# Patient Record
Sex: FEMALE | Race: WHITE | NOT HISPANIC OR LATINO | URBAN - METROPOLITAN AREA
[De-identification: names, ages, dates, MRNs, and addresses within clinical notes are randomized per-mention and may not be internally consistent; named-entity substitution may affect disease eponyms.]

---

## 2017-01-03 ENCOUNTER — FOLLOW UP ESTABLISHED (OUTPATIENT)
Dept: URBAN - METROPOLITAN AREA CLINIC 24 | Facility: CLINIC | Age: 77
End: 2017-01-03
Payer: MEDICARE

## 2017-01-03 PROCEDURE — 92134 CPTRZ OPH DX IMG PST SGM RTA: CPT | Performed by: OPHTHALMOLOGY

## 2017-01-03 PROCEDURE — 67028 INJECTION EYE DRUG: CPT | Performed by: OPHTHALMOLOGY

## 2017-01-03 ASSESSMENT — INTRAOCULAR PRESSURE
OD: 15
OS: 13

## 2017-01-30 ENCOUNTER — FOLLOW UP ESTABLISHED (OUTPATIENT)
Dept: URBAN - METROPOLITAN AREA CLINIC 30 | Facility: CLINIC | Age: 77
End: 2017-01-30
Payer: COMMERCIAL

## 2017-01-30 PROCEDURE — 67028 INJECTION EYE DRUG: CPT | Performed by: OPHTHALMOLOGY

## 2017-01-30 ASSESSMENT — INTRAOCULAR PRESSURE
OS: 13
OD: 15

## 2017-02-13 ENCOUNTER — FOLLOW UP ESTABLISHED (OUTPATIENT)
Dept: URBAN - METROPOLITAN AREA CLINIC 30 | Facility: CLINIC | Age: 77
End: 2017-02-13
Payer: COMMERCIAL

## 2017-02-13 PROCEDURE — 92134 CPTRZ OPH DX IMG PST SGM RTA: CPT | Performed by: OPHTHALMOLOGY

## 2017-02-13 PROCEDURE — 67028 INJECTION EYE DRUG: CPT | Performed by: OPHTHALMOLOGY

## 2017-02-13 ASSESSMENT — INTRAOCULAR PRESSURE
OD: 18
OS: 18

## 2017-03-13 ENCOUNTER — FOLLOW UP ESTABLISHED (OUTPATIENT)
Dept: URBAN - METROPOLITAN AREA CLINIC 30 | Facility: CLINIC | Age: 77
End: 2017-03-13
Payer: COMMERCIAL

## 2017-03-13 PROCEDURE — 92134 CPTRZ OPH DX IMG PST SGM RTA: CPT | Performed by: OPHTHALMOLOGY

## 2017-03-13 PROCEDURE — 92242 FLUORESCEIN&ICG ANGIOGRAPHY: CPT | Performed by: OPHTHALMOLOGY

## 2017-03-13 PROCEDURE — 67028 INJECTION EYE DRUG: CPT | Performed by: OPHTHALMOLOGY

## 2017-03-13 ASSESSMENT — INTRAOCULAR PRESSURE
OD: 15
OS: 12

## 2017-04-24 ENCOUNTER — FOLLOW UP ESTABLISHED (OUTPATIENT)
Dept: URBAN - METROPOLITAN AREA CLINIC 30 | Facility: CLINIC | Age: 77
End: 2017-04-24
Payer: COMMERCIAL

## 2017-04-24 PROCEDURE — 92134 CPTRZ OPH DX IMG PST SGM RTA: CPT | Performed by: OPHTHALMOLOGY

## 2017-04-24 PROCEDURE — 67028 INJECTION EYE DRUG: CPT | Performed by: OPHTHALMOLOGY

## 2017-04-24 ASSESSMENT — INTRAOCULAR PRESSURE
OD: 22
OS: 15

## 2017-06-05 ENCOUNTER — FOLLOW UP ESTABLISHED (OUTPATIENT)
Dept: URBAN - METROPOLITAN AREA CLINIC 30 | Facility: CLINIC | Age: 77
End: 2017-06-05
Payer: COMMERCIAL

## 2017-06-05 PROCEDURE — 92134 CPTRZ OPH DX IMG PST SGM RTA: CPT | Performed by: OPHTHALMOLOGY

## 2017-06-05 PROCEDURE — 67028 INJECTION EYE DRUG: CPT | Performed by: OPHTHALMOLOGY

## 2017-06-05 ASSESSMENT — INTRAOCULAR PRESSURE
OD: 24
OS: 17

## 2017-07-20 ENCOUNTER — FOLLOW UP ESTABLISHED (OUTPATIENT)
Dept: URBAN - METROPOLITAN AREA CLINIC 24 | Facility: CLINIC | Age: 77
End: 2017-07-20
Payer: COMMERCIAL

## 2017-07-20 DIAGNOSIS — H35.3221 EXDTVE AGE-REL MCLR DEGN, LEFT EYE, WITH ACTV CHRDL NEOVAS: Primary | ICD-10-CM

## 2017-07-20 PROCEDURE — 67028 INJECTION EYE DRUG: CPT | Performed by: OPHTHALMOLOGY

## 2017-07-20 PROCEDURE — 92235 FLUORESCEIN ANGRPH MLTIFRAME: CPT | Performed by: OPHTHALMOLOGY

## 2017-07-20 PROCEDURE — 92134 CPTRZ OPH DX IMG PST SGM RTA: CPT | Performed by: OPHTHALMOLOGY

## 2017-07-20 ASSESSMENT — INTRAOCULAR PRESSURE
OD: 18
OS: 13

## 2017-09-11 ENCOUNTER — FOLLOW UP ESTABLISHED (OUTPATIENT)
Dept: URBAN - METROPOLITAN AREA CLINIC 30 | Facility: CLINIC | Age: 77
End: 2017-09-11
Payer: COMMERCIAL

## 2017-09-11 PROCEDURE — 67028 INJECTION EYE DRUG: CPT | Performed by: OPHTHALMOLOGY

## 2017-09-11 PROCEDURE — 92134 CPTRZ OPH DX IMG PST SGM RTA: CPT | Performed by: OPHTHALMOLOGY

## 2017-09-11 ASSESSMENT — INTRAOCULAR PRESSURE
OD: 28
OS: 17

## 2017-11-06 ENCOUNTER — FOLLOW UP ESTABLISHED (OUTPATIENT)
Dept: URBAN - METROPOLITAN AREA CLINIC 30 | Facility: CLINIC | Age: 77
End: 2017-11-06
Payer: COMMERCIAL

## 2017-11-06 PROCEDURE — 92134 CPTRZ OPH DX IMG PST SGM RTA: CPT | Performed by: OPHTHALMOLOGY

## 2017-11-06 PROCEDURE — 67028 INJECTION EYE DRUG: CPT | Performed by: OPHTHALMOLOGY

## 2017-11-06 PROCEDURE — 92250 FUNDUS PHOTOGRAPHY W/I&R: CPT | Performed by: OPHTHALMOLOGY

## 2017-11-06 ASSESSMENT — INTRAOCULAR PRESSURE
OD: 30
OS: 20

## 2017-11-14 ENCOUNTER — FOLLOW UP ESTABLISHED (OUTPATIENT)
Dept: URBAN - METROPOLITAN AREA CLINIC 30 | Facility: CLINIC | Age: 77
End: 2017-11-14
Payer: COMMERCIAL

## 2017-11-14 DIAGNOSIS — H40.051 OCULAR HYPERTENSION, RIGHT EYE: Primary | ICD-10-CM

## 2017-11-14 PROCEDURE — 92014 COMPRE OPH EXAM EST PT 1/>: CPT | Performed by: OPTOMETRIST

## 2017-11-14 PROCEDURE — 76514 ECHO EXAM OF EYE THICKNESS: CPT | Performed by: OPTOMETRIST

## 2017-11-14 PROCEDURE — 92134 CPTRZ OPH DX IMG PST SGM RTA: CPT | Performed by: OPTOMETRIST

## 2017-11-14 RX ORDER — DORZOLAMIDE HYDROCHLORIDE AND TIMOLOL MALEATE 20; 5 MG/ML; MG/ML
SOLUTION/ DROPS OPHTHALMIC
Qty: 1 | Refills: 5 | Status: INACTIVE
Start: 2017-11-14 | End: 2018-02-19

## 2017-11-14 ASSESSMENT — VISUAL ACUITY
OS: 20/25
OD: 20/60

## 2017-11-14 ASSESSMENT — INTRAOCULAR PRESSURE
OD: 33
OS: 24
OS: 20
OD: 38

## 2017-11-14 ASSESSMENT — KERATOMETRY
OD: 43.10
OS: 43.25

## 2017-11-21 ENCOUNTER — FOLLOW UP ESTABLISHED (OUTPATIENT)
Dept: URBAN - METROPOLITAN AREA CLINIC 30 | Facility: CLINIC | Age: 77
End: 2017-11-21
Payer: COMMERCIAL

## 2017-11-21 PROCEDURE — 99213 OFFICE O/P EST LOW 20 MIN: CPT | Performed by: OPTOMETRIST

## 2017-11-21 ASSESSMENT — INTRAOCULAR PRESSURE
OD: 18
OS: 18

## 2018-01-03 ENCOUNTER — FOLLOW UP ESTABLISHED (OUTPATIENT)
Dept: URBAN - METROPOLITAN AREA CLINIC 24 | Facility: CLINIC | Age: 78
End: 2018-01-03
Payer: MEDICARE

## 2018-01-03 PROCEDURE — 92242 FLUORESCEIN&ICG ANGIOGRAPHY: CPT | Performed by: OPHTHALMOLOGY

## 2018-01-03 PROCEDURE — 92134 CPTRZ OPH DX IMG PST SGM RTA: CPT | Performed by: OPHTHALMOLOGY

## 2018-01-03 ASSESSMENT — INTRAOCULAR PRESSURE
OS: 24
OD: 22

## 2018-02-26 ENCOUNTER — FOLLOW UP ESTABLISHED (OUTPATIENT)
Dept: URBAN - METROPOLITAN AREA CLINIC 30 | Facility: CLINIC | Age: 78
End: 2018-02-26
Payer: MEDICARE

## 2018-02-26 PROCEDURE — 92134 CPTRZ OPH DX IMG PST SGM RTA: CPT | Performed by: OPHTHALMOLOGY

## 2018-02-26 ASSESSMENT — INTRAOCULAR PRESSURE
OS: 32
OD: 32

## 2018-04-23 ENCOUNTER — FOLLOW UP ESTABLISHED (OUTPATIENT)
Dept: URBAN - METROPOLITAN AREA CLINIC 30 | Facility: CLINIC | Age: 78
End: 2018-04-23
Payer: MEDICARE

## 2018-04-23 PROCEDURE — 92250 FUNDUS PHOTOGRAPHY W/I&R: CPT | Performed by: OPHTHALMOLOGY

## 2018-04-23 PROCEDURE — 92014 COMPRE OPH EXAM EST PT 1/>: CPT | Performed by: OPHTHALMOLOGY

## 2018-04-23 RX ORDER — LEVOTHYROXINE SODIUM 75 UG/1
TABLET ORAL
Qty: 0 | Refills: 0 | Status: INACTIVE
Start: 2018-04-23 | End: 2018-06-18

## 2018-04-23 ASSESSMENT — INTRAOCULAR PRESSURE
OD: 22
OS: 22

## 2018-05-04 ENCOUNTER — FOLLOW UP ESTABLISHED (OUTPATIENT)
Dept: URBAN - METROPOLITAN AREA CLINIC 30 | Facility: CLINIC | Age: 78
End: 2018-05-04
Payer: MEDICARE

## 2018-05-04 DIAGNOSIS — H40.053 OCULAR HYPERTENSION, BILATERAL: Primary | ICD-10-CM

## 2018-05-04 PROCEDURE — 92134 CPTRZ OPH DX IMG PST SGM RTA: CPT | Performed by: OPTOMETRIST

## 2018-05-04 PROCEDURE — 92014 COMPRE OPH EXAM EST PT 1/>: CPT | Performed by: OPTOMETRIST

## 2018-05-04 ASSESSMENT — INTRAOCULAR PRESSURE
OD: 39
OD: 38
OS: 38
OS: 40

## 2018-05-04 ASSESSMENT — VISUAL ACUITY
OD: 20/60
OS: 20/50

## 2018-05-04 ASSESSMENT — KERATOMETRY
OD: 43.08
OS: 43.45

## 2018-05-10 ENCOUNTER — FOLLOW UP ESTABLISHED (OUTPATIENT)
Dept: URBAN - METROPOLITAN AREA CLINIC 30 | Facility: CLINIC | Age: 78
End: 2018-05-10
Payer: MEDICARE

## 2018-05-10 PROCEDURE — 92012 INTRM OPH EXAM EST PATIENT: CPT | Performed by: OPTOMETRIST

## 2018-05-10 RX ORDER — BRIMONIDINE TARTRATE 2 MG/ML
0.2 % SOLUTION/ DROPS OPHTHALMIC
Qty: 5 | Refills: 3 | Status: INACTIVE
Start: 2018-05-10 | End: 2018-08-29

## 2018-05-10 RX ORDER — ACETAZOLAMIDE 250 MG/1
250 MG TABLET ORAL
Qty: 20 | Refills: 3 | Status: INACTIVE
Start: 2018-05-10 | End: 2018-07-05

## 2018-05-10 ASSESSMENT — INTRAOCULAR PRESSURE
OD: 39
OS: 21
OD: 40
OS: 22

## 2018-05-15 ENCOUNTER — FOLLOW UP ESTABLISHED (OUTPATIENT)
Dept: URBAN - METROPOLITAN AREA CLINIC 24 | Facility: CLINIC | Age: 78
End: 2018-05-15
Payer: MEDICARE

## 2018-05-15 DIAGNOSIS — H40.1131 PRIMARY OPEN-ANGLE GLAUCOMA, MILD STAGE, BILATERAL: Primary | ICD-10-CM

## 2018-05-15 PROCEDURE — 92083 EXTENDED VISUAL FIELD XM: CPT | Performed by: OPHTHALMOLOGY

## 2018-05-15 PROCEDURE — 92020 GONIOSCOPY: CPT | Performed by: OPHTHALMOLOGY

## 2018-05-15 PROCEDURE — 92133 CPTRZD OPH DX IMG PST SGM ON: CPT | Performed by: OPHTHALMOLOGY

## 2018-05-15 PROCEDURE — 92014 COMPRE OPH EXAM EST PT 1/>: CPT | Performed by: OPHTHALMOLOGY

## 2018-05-15 PROCEDURE — 92250 FUNDUS PHOTOGRAPHY W/I&R: CPT | Performed by: OPHTHALMOLOGY

## 2018-05-15 ASSESSMENT — INTRAOCULAR PRESSURE
OD: 18
OS: 18

## 2018-06-18 ENCOUNTER — FOLLOW UP ESTABLISHED (OUTPATIENT)
Dept: URBAN - METROPOLITAN AREA CLINIC 30 | Facility: CLINIC | Age: 78
End: 2018-06-18
Payer: MEDICARE

## 2018-06-18 PROCEDURE — 92134 CPTRZ OPH DX IMG PST SGM RTA: CPT | Performed by: OPHTHALMOLOGY

## 2018-06-18 PROCEDURE — 92235 FLUORESCEIN ANGRPH MLTIFRAME: CPT | Performed by: OPHTHALMOLOGY

## 2018-06-18 RX ORDER — BIMATOPROST 0.1 MG/ML
0.01 % SOLUTION/ DROPS OPHTHALMIC
Qty: 0 | Refills: 0 | Status: INACTIVE
Start: 2018-06-18 | End: 2018-07-05

## 2018-06-18 ASSESSMENT — INTRAOCULAR PRESSURE
OS: 20
OD: 21

## 2018-06-28 ENCOUNTER — OFFICE VISIT (OUTPATIENT)
Dept: URBAN - METROPOLITAN AREA CLINIC 23 | Facility: CLINIC | Age: 78
End: 2018-06-28
Payer: COMMERCIAL

## 2018-06-28 DIAGNOSIS — H40.1134 PRIMARY OPEN-ANGLE GLAUCOMA, BILATERAL, INDETERMINATE STAGE: Primary | ICD-10-CM

## 2018-06-28 PROCEDURE — 92004 COMPRE OPH EXAM NEW PT 1/>: CPT | Performed by: OPHTHALMOLOGY

## 2018-06-28 PROCEDURE — 92020 GONIOSCOPY: CPT | Performed by: OPHTHALMOLOGY

## 2018-06-28 PROCEDURE — 76514 ECHO EXAM OF EYE THICKNESS: CPT | Performed by: OPHTHALMOLOGY

## 2018-06-28 PROCEDURE — 92133 CPTRZD OPH DX IMG PST SGM ON: CPT | Performed by: OPHTHALMOLOGY

## 2018-06-28 RX ORDER — NETARSUDIL 0.2 MG/ML
0.02 % SOLUTION/ DROPS OPHTHALMIC; TOPICAL
Qty: 5 | Refills: 5 | Status: INACTIVE
Start: 2018-06-28 | End: 2018-07-23

## 2018-06-28 RX ORDER — DORZOLAMIDE HYDROCHLORIDE AND TIMOLOL MALEATE 20; 5 MG/ML; MG/ML
SOLUTION/ DROPS OPHTHALMIC
Qty: 1 | Refills: 5 | Status: INACTIVE
Start: 2018-06-28 | End: 2018-08-29

## 2018-06-28 ASSESSMENT — INTRAOCULAR PRESSURE
OD: 25
OS: 33

## 2018-06-28 NOTE — IMPRESSION/PLAN
Impression: Primary open-angle glaucoma, bilateral, indeterminate stage: H40.1134. OU.
-IOP elevated ou - ONH large CD ratio ou -
hx of avastin injections ou. Plan: Discussed diagnosis, explained and understood by patient. Discussed IOP/ONH/Glaucoma management and risks. continue brimonidine tid ou,
acetazolamide tabs bid PO. continue cosopt bid ou and lumigan OD qhs. OCT ordered, performed and reviewed. Start Rhopressa ou qhs. samples given today-- Discussed side effects of glaucoma meds. Recommends Trabeculectomy ou to lower IOP. RL=2 Discussed RBA's including bleeding, infection, loss of eye or sight. Patient elects Trabeculectomy OD but wants to wait for now. Will continue to monitor condition and symptoms.

## 2018-07-23 ENCOUNTER — OFFICE VISIT (OUTPATIENT)
Dept: URBAN - METROPOLITAN AREA CLINIC 23 | Facility: CLINIC | Age: 78
End: 2018-07-23
Payer: COMMERCIAL

## 2018-07-23 PROCEDURE — 99213 OFFICE O/P EST LOW 20 MIN: CPT | Performed by: OPHTHALMOLOGY

## 2018-07-23 RX ORDER — ACETAZOLAMIDE 250 MG/1
250 MG TABLET ORAL
Qty: 100 | Refills: 2 | Status: INACTIVE
Start: 2018-07-23 | End: 2018-08-29

## 2018-07-23 RX ORDER — NETARSUDIL 0.2 MG/ML
0.02 % SOLUTION/ DROPS OPHTHALMIC; TOPICAL
Qty: 5 | Refills: 5 | Status: INACTIVE
Start: 2018-07-23 | End: 2018-08-27

## 2018-07-23 RX ORDER — BIMATOPROST 0.1 MG/ML
0.01 % SOLUTION/ DROPS OPHTHALMIC
Qty: 1 | Refills: 5 | Status: INACTIVE
Start: 2018-07-23 | End: 2018-08-29

## 2018-07-23 ASSESSMENT — INTRAOCULAR PRESSURE
OS: 27
OD: 20

## 2018-07-23 NOTE — IMPRESSION/PLAN
Impression: Primary open-angle glaucoma, bilateral, moderate stage: R86.5365. OU.
-IOP lowered with rhopressa but still too high. ONH large CD ratio ou - hx of avastin injections ou. Plan: Discussed diagnosis in great detail. Discussed IOP/ONH/Glaucoma management and risks. continue brimonidine tid ou, start lumigan OS qhs also. acetazolamide tabs bid PO. continue cosopt bid ou and lumigan OD qhs.
continue Rhopressa ou qhs. samples given today--
discussed ALT  and CPC laser and Trabeculectomy ou or aqueous shunt. patient will decide if she wants ALT or micropulse CPC laser OD or Trabeculectomy. discussed RBA's including bleeding, infection, loss of eye or sight. RL=2 Patient will decide if she wants Trabeculectomy OD.

## 2018-08-27 ENCOUNTER — FOLLOW UP ESTABLISHED (OUTPATIENT)
Dept: URBAN - METROPOLITAN AREA CLINIC 30 | Facility: CLINIC | Age: 78
End: 2018-08-27
Payer: MEDICARE

## 2018-08-27 PROCEDURE — 92134 CPTRZ OPH DX IMG PST SGM RTA: CPT | Performed by: OPHTHALMOLOGY

## 2018-08-27 ASSESSMENT — INTRAOCULAR PRESSURE
OS: 13
OD: 28

## 2018-09-12 ENCOUNTER — SURGERY (OUTPATIENT)
Dept: URBAN - METROPOLITAN AREA SURGERY 11 | Facility: SURGERY | Age: 78
End: 2018-09-12
Payer: COMMERCIAL

## 2018-09-12 PROCEDURE — 66170 GLAUCOMA SURGERY: CPT | Performed by: OPHTHALMOLOGY

## 2018-09-13 ENCOUNTER — POST-OPERATIVE VISIT (OUTPATIENT)
Dept: URBAN - METROPOLITAN AREA CLINIC 23 | Facility: CLINIC | Age: 78
End: 2018-09-13

## 2018-09-13 DIAGNOSIS — Z09 ENCNTR FOR F/U EXAM AFT TRTMT FOR COND OTH THAN MALIG NEOPLM: Primary | ICD-10-CM

## 2018-09-13 PROCEDURE — 99024 POSTOP FOLLOW-UP VISIT: CPT | Performed by: OPTOMETRIST

## 2018-09-13 ASSESSMENT — INTRAOCULAR PRESSURE
OD: 18
OS: 43

## 2018-09-17 ENCOUNTER — POST-OPERATIVE VISIT (OUTPATIENT)
Dept: URBAN - METROPOLITAN AREA CLINIC 23 | Facility: CLINIC | Age: 78
End: 2018-09-17

## 2018-09-17 PROCEDURE — 99024 POSTOP FOLLOW-UP VISIT: CPT | Performed by: OPHTHALMOLOGY

## 2018-09-17 RX ORDER — DORZOLAMIDE HYDROCHLORIDE AND TIMOLOL MALEATE 20; 5 MG/ML; MG/ML
SOLUTION/ DROPS OPHTHALMIC
Qty: 1 | Refills: 5 | Status: INACTIVE
Start: 2018-09-17 | End: 2018-12-04

## 2018-09-17 RX ORDER — BIMATOPROST 0.1 MG/ML
0.01 % SOLUTION/ DROPS OPHTHALMIC
Qty: 1 | Refills: 5 | Status: INACTIVE
Start: 2018-09-17 | End: 2018-12-04

## 2018-09-17 RX ORDER — BRIMONIDINE TARTRATE 2 MG/ML
0.2 % SOLUTION/ DROPS OPHTHALMIC
Qty: 1 | Refills: 3 | Status: INACTIVE
Start: 2018-09-17 | End: 2018-12-04

## 2018-09-17 ASSESSMENT — INTRAOCULAR PRESSURE
OD: 14
OS: 24

## 2018-10-03 ENCOUNTER — OFFICE VISIT (OUTPATIENT)
Dept: URBAN - METROPOLITAN AREA CLINIC 23 | Facility: CLINIC | Age: 78
End: 2018-10-03

## 2018-10-03 PROCEDURE — 99024 POSTOP FOLLOW-UP VISIT: CPT | Performed by: OPHTHALMOLOGY

## 2018-10-03 ASSESSMENT — INTRAOCULAR PRESSURE
OS: 33
OD: 15

## 2018-10-18 ENCOUNTER — POST-OPERATIVE VISIT (OUTPATIENT)
Dept: URBAN - METROPOLITAN AREA CLINIC 23 | Facility: CLINIC | Age: 78
End: 2018-10-18

## 2018-10-18 PROCEDURE — 99024 POSTOP FOLLOW-UP VISIT: CPT | Performed by: OPHTHALMOLOGY

## 2018-10-18 ASSESSMENT — INTRAOCULAR PRESSURE
OD: 16
OS: 34

## 2018-11-05 ENCOUNTER — FOLLOW UP ESTABLISHED (OUTPATIENT)
Dept: URBAN - METROPOLITAN AREA CLINIC 30 | Facility: CLINIC | Age: 78
End: 2018-11-05
Payer: MEDICARE

## 2018-11-05 PROCEDURE — 92134 CPTRZ OPH DX IMG PST SGM RTA: CPT | Performed by: OPHTHALMOLOGY

## 2018-11-05 PROCEDURE — 92014 COMPRE OPH EXAM EST PT 1/>: CPT | Performed by: OPHTHALMOLOGY

## 2018-11-05 ASSESSMENT — INTRAOCULAR PRESSURE
OD: 14
OS: 18

## 2018-11-06 ENCOUNTER — POST-OPERATIVE VISIT (OUTPATIENT)
Dept: URBAN - METROPOLITAN AREA CLINIC 23 | Facility: CLINIC | Age: 78
End: 2018-11-06

## 2018-11-06 PROCEDURE — 99024 POSTOP FOLLOW-UP VISIT: CPT | Performed by: OPHTHALMOLOGY

## 2018-11-06 ASSESSMENT — INTRAOCULAR PRESSURE
OD: 17
OS: 24

## 2018-11-28 ENCOUNTER — SURGERY (OUTPATIENT)
Dept: URBAN - METROPOLITAN AREA SURGERY 11 | Facility: SURGERY | Age: 78
End: 2018-11-28
Payer: MEDICARE

## 2018-11-28 PROCEDURE — 66170 GLAUCOMA SURGERY: CPT | Performed by: OPHTHALMOLOGY

## 2018-11-29 ENCOUNTER — POST-OPERATIVE VISIT (OUTPATIENT)
Dept: URBAN - METROPOLITAN AREA CLINIC 23 | Facility: CLINIC | Age: 78
End: 2018-11-29

## 2018-11-29 PROCEDURE — 99024 POSTOP FOLLOW-UP VISIT: CPT | Performed by: OPTOMETRIST

## 2018-11-29 ASSESSMENT — INTRAOCULAR PRESSURE
OS: 30
OS: 20
OD: 16
OD: 24

## 2018-12-04 ENCOUNTER — POST-OPERATIVE VISIT (OUTPATIENT)
Dept: URBAN - METROPOLITAN AREA CLINIC 23 | Facility: CLINIC | Age: 78
End: 2018-12-04
Payer: MEDICARE

## 2018-12-04 PROCEDURE — 99024 POSTOP FOLLOW-UP VISIT: CPT | Performed by: OPHTHALMOLOGY

## 2018-12-04 RX ORDER — PREDNISOLONE ACETATE 10 MG/ML
1 % SUSPENSION/ DROPS OPHTHALMIC
Qty: 10 | Refills: 4 | Status: INACTIVE
Start: 2018-12-04 | End: 2019-02-27

## 2018-12-04 ASSESSMENT — INTRAOCULAR PRESSURE
OD: 20
OS: 27

## 2018-12-13 ENCOUNTER — POST-OPERATIVE VISIT (OUTPATIENT)
Dept: URBAN - METROPOLITAN AREA CLINIC 23 | Facility: CLINIC | Age: 78
End: 2018-12-13

## 2018-12-13 PROCEDURE — 99024 POSTOP FOLLOW-UP VISIT: CPT | Performed by: OPHTHALMOLOGY

## 2018-12-13 ASSESSMENT — INTRAOCULAR PRESSURE
OS: 22
OS: 8
OD: 18

## 2018-12-27 ENCOUNTER — POST-OPERATIVE VISIT (OUTPATIENT)
Dept: URBAN - METROPOLITAN AREA CLINIC 32 | Facility: CLINIC | Age: 78
End: 2018-12-27

## 2018-12-27 PROCEDURE — 99024 POSTOP FOLLOW-UP VISIT: CPT | Performed by: OPHTHALMOLOGY

## 2018-12-27 ASSESSMENT — INTRAOCULAR PRESSURE
OS: 24
OS: 16
OD: 16
OD: 21

## 2019-01-28 ENCOUNTER — FOLLOW UP ESTABLISHED (OUTPATIENT)
Dept: URBAN - METROPOLITAN AREA CLINIC 30 | Facility: CLINIC | Age: 79
End: 2019-01-28
Payer: MEDICARE

## 2019-01-28 PROCEDURE — 92134 CPTRZ OPH DX IMG PST SGM RTA: CPT | Performed by: OPHTHALMOLOGY

## 2019-01-28 ASSESSMENT — INTRAOCULAR PRESSURE
OD: 14
OS: 13

## 2019-01-30 ENCOUNTER — POST-OPERATIVE VISIT (OUTPATIENT)
Dept: URBAN - METROPOLITAN AREA CLINIC 23 | Facility: CLINIC | Age: 79
End: 2019-01-30

## 2019-01-30 PROCEDURE — 99024 POSTOP FOLLOW-UP VISIT: CPT | Performed by: OPHTHALMOLOGY

## 2019-01-30 ASSESSMENT — INTRAOCULAR PRESSURE
OD: 22
OS: 20

## 2019-02-27 ENCOUNTER — OFFICE VISIT (OUTPATIENT)
Dept: URBAN - METROPOLITAN AREA CLINIC 23 | Facility: CLINIC | Age: 79
End: 2019-02-27
Payer: MEDICARE

## 2019-02-27 PROCEDURE — 99213 OFFICE O/P EST LOW 20 MIN: CPT | Performed by: OPHTHALMOLOGY

## 2019-02-27 ASSESSMENT — INTRAOCULAR PRESSURE
OD: 18
OS: 14

## 2019-02-27 NOTE — IMPRESSION/PLAN
Impression: Primary open-angle glaucoma, bilateral, moderate stage: B13.3669. OU. - Trabeculectomy OD 9/12/18-
-S/P Trab OS - 11/28/18
-IOP doing well ou - ONH stable ou Plan: Discussed diagnosis, explained and understood by patient. Discussed IOP/ONH/Glaucoma management and risks. Continue massage qid od and qd os. Taper prednisolone bid OS -
no glaucoma meds needed ou. Will continue to monitor condition and symptoms.

## 2019-04-01 ENCOUNTER — OFFICE VISIT (OUTPATIENT)
Dept: URBAN - METROPOLITAN AREA CLINIC 23 | Facility: CLINIC | Age: 79
End: 2019-04-01
Payer: MEDICARE

## 2019-04-01 PROCEDURE — 99213 OFFICE O/P EST LOW 20 MIN: CPT | Performed by: OPHTHALMOLOGY

## 2019-04-01 ASSESSMENT — INTRAOCULAR PRESSURE
OS: 15
OD: 18

## 2019-04-01 NOTE — IMPRESSION/PLAN
Impression: Primary open-angle glaucoma, bilateral, moderate stage: B16.7204. OU. - Trabeculectomy OD 9/12/18 Trabeculectomy OS  11/28/18 IOP/ONH stable OU Plan: Discussed diagnosis, explained and understood by patient. Discussed IOP/ONH/Glaucoma management and risks. Continue massage qid od and qd os. Taper pf qd os. No glaucoma meds needed ou. Will continue to monitor condition and symptoms.

## 2019-04-22 ENCOUNTER — FOLLOW UP ESTABLISHED (OUTPATIENT)
Dept: URBAN - METROPOLITAN AREA CLINIC 30 | Facility: CLINIC | Age: 79
End: 2019-04-22
Payer: MEDICARE

## 2019-04-22 PROCEDURE — 92250 FUNDUS PHOTOGRAPHY W/I&R: CPT | Performed by: OPHTHALMOLOGY

## 2019-04-22 PROCEDURE — 92235 FLUORESCEIN ANGRPH MLTIFRAME: CPT | Performed by: OPHTHALMOLOGY

## 2019-04-22 ASSESSMENT — INTRAOCULAR PRESSURE
OD: 15
OS: 13

## 2019-05-02 ENCOUNTER — OFFICE VISIT (OUTPATIENT)
Dept: URBAN - METROPOLITAN AREA CLINIC 23 | Facility: CLINIC | Age: 79
End: 2019-05-02
Payer: MEDICARE

## 2019-05-02 PROCEDURE — 92133 CPTRZD OPH DX IMG PST SGM ON: CPT | Performed by: OPHTHALMOLOGY

## 2019-05-02 PROCEDURE — 99213 OFFICE O/P EST LOW 20 MIN: CPT | Performed by: OPHTHALMOLOGY

## 2019-05-02 ASSESSMENT — INTRAOCULAR PRESSURE
OS: 11
OD: 16
OS: 16
OD: 20

## 2019-05-02 NOTE — IMPRESSION/PLAN
Impression: WET AMD OS then OD -AREDS2, diet, non- smoker, Amsler  -- Stable w injx.   Maintain Plan: F/U with Dr Gonzalez Husbands as scheduled

## 2019-05-02 NOTE — IMPRESSION/PLAN
Impression: Primary open-angle glaucoma, bilateral, moderate stage: X06.2385. OU. - Trabeculectomy OD 9/12/18 Trabeculectomy OS  11/28/18 IOP/ONH stable OU Plan: Discussed diagnosis, explained and understood by patient. Discussed IOP/ONH/Glaucoma management and risks. Continue massage qid od and qd os. OCT OU ordered and performed today and results discussed with patient. No glaucoma meds needed ou. Will continue to monitor condition and symptoms.

## 2019-07-15 ENCOUNTER — FOLLOW UP ESTABLISHED (OUTPATIENT)
Dept: URBAN - METROPOLITAN AREA CLINIC 30 | Facility: CLINIC | Age: 79
End: 2019-07-15
Payer: MEDICARE

## 2019-07-15 PROCEDURE — 92134 CPTRZ OPH DX IMG PST SGM RTA: CPT | Performed by: OPHTHALMOLOGY

## 2019-08-29 ENCOUNTER — OFFICE VISIT (OUTPATIENT)
Dept: URBAN - METROPOLITAN AREA CLINIC 23 | Facility: CLINIC | Age: 79
End: 2019-08-29
Payer: MEDICARE

## 2019-08-29 DIAGNOSIS — H35.3231 EXUDATIVE AGE-RELATED MACULAR DEGENERATION, BILATERAL, WITH ACTIVE CHOROIDAL NEOVASCULARIZATION: ICD-10-CM

## 2019-08-29 PROCEDURE — 99213 OFFICE O/P EST LOW 20 MIN: CPT | Performed by: OPHTHALMOLOGY

## 2019-08-29 ASSESSMENT — INTRAOCULAR PRESSURE
OS: 13
OD: 16

## 2019-08-29 NOTE — IMPRESSION/PLAN
Impression: Primary open-angle glaucoma, bilateral, moderate stage: W84.5022. OU. - Trabeculectomy OD 9/12/18 -- Trabeculectomy OS  11/28/18 -IOP doing well ou - ONH stable ou - Plan: Discussed diagnosis, explained and understood by patient. Discussed IOP/ONH/Glaucoma management and risks. Continue massage qid OD and qd OS. No glaucoma meds needed ou. continue to monitor condition and symptoms. Reconsult PRN with Dr Courtney Schumacher.

## 2019-08-29 NOTE — IMPRESSION/PLAN
Impression: Exudative age-related macular degeneration, bilateral, with active choroidal neovascularization: H35.3231. OU. established, stable - Plan: Discussed diagnosis in detail with patient. No treatment is required at this time. Use of vitamins has shown to improve the effects of ARMD. Counseling about the benefits and/or risks of the Age-Related Eye Disease Study (AREDS) formulation for preventing progression of age-related macular degeneration (AMD) was provided to the patient and/or caregiver(s). Will continue to observe condition and or symptoms.

## 2019-08-29 NOTE — IMPRESSION/PLAN
Impression: Age-related nuclear cataract, bilateral: H25.13. OU. Vision: vision affected. Plan: Cataracts account for the patient's vision complaints. Discussed diagnosis in detail with patient. Discussed treatment options with patient. Recommend cataract consultation when patient desires if unable to correct with glasses RX.

## 2019-10-03 ENCOUNTER — NEW PATIENT (OUTPATIENT)
Dept: URBAN - METROPOLITAN AREA CLINIC 30 | Facility: CLINIC | Age: 79
End: 2019-10-03
Payer: MEDICARE

## 2019-10-03 DIAGNOSIS — H40.1132 PRIMARY OPEN-ANGLE GLAUCOMA, BILATERAL, MODERATE STAGE: ICD-10-CM

## 2019-10-03 DIAGNOSIS — H04.123 TEAR FILM INSUFFICIENCY OF BILATERAL LACRIMAL GLANDS: ICD-10-CM

## 2019-10-03 DIAGNOSIS — H43.813 VITREOUS DEGENERATION, BILATERAL: ICD-10-CM

## 2019-10-03 PROCEDURE — 92134 CPTRZ OPH DX IMG PST SGM RTA: CPT | Performed by: OPTOMETRIST

## 2019-10-03 PROCEDURE — 92014 COMPRE OPH EXAM EST PT 1/>: CPT | Performed by: OPTOMETRIST

## 2019-10-03 PROCEDURE — 92004 COMPRE OPH EXAM NEW PT 1/>: CPT | Performed by: OPTOMETRIST

## 2019-10-03 ASSESSMENT — KERATOMETRY
OS: 43.28
OD: 43.08

## 2019-10-03 ASSESSMENT — INTRAOCULAR PRESSURE
OD: 23
OS: 19
OD: 24
OS: 20

## 2019-10-03 ASSESSMENT — VISUAL ACUITY
OS: 20/60
OD: 20/150

## 2019-10-04 ENCOUNTER — OFFICE VISIT (OUTPATIENT)
Dept: URBAN - METROPOLITAN AREA CLINIC 29 | Facility: CLINIC | Age: 79
End: 2019-10-04
Payer: MEDICARE

## 2019-10-04 PROCEDURE — 99213 OFFICE O/P EST LOW 20 MIN: CPT | Performed by: OPHTHALMOLOGY

## 2019-10-04 RX ORDER — TIMOLOL MALEATE 5 MG/ML
0.5 % SOLUTION/ DROPS OPHTHALMIC
Qty: 10 | Refills: 5 | Status: INACTIVE
Start: 2019-10-04 | End: 2020-10-15

## 2019-10-04 ASSESSMENT — INTRAOCULAR PRESSURE
OS: 20
OD: 23

## 2019-10-04 NOTE — IMPRESSION/PLAN
Impression: Primary open-angle glaucoma, bilateral, moderate stage: X23.2633. OU. Trabeculectomy OD 9/12/18 - Trabeculectomy OS  11/28/18 - IOP elevated ou - Plan: Discussed diagnosis, explained and understood by patient. Discussed IOP/ONH/Glaucoma management and risks. Continue massage qid OD and qd OS. Discussed options with patient to lower IOP MIGS procedure vs adding glaucoma meds. Patient elects drops for now. start timolol ou qam. discussed side-effects of glaucoma meds. continue to monitor condition and symptoms. Reconsult PRN with Dr Marlowe Paget.

## 2019-10-07 ENCOUNTER — FOLLOW UP ESTABLISHED (OUTPATIENT)
Dept: URBAN - METROPOLITAN AREA CLINIC 30 | Facility: CLINIC | Age: 79
End: 2019-10-07
Payer: MEDICARE

## 2019-10-07 PROCEDURE — 92014 COMPRE OPH EXAM EST PT 1/>: CPT | Performed by: OPHTHALMOLOGY

## 2019-10-07 PROCEDURE — 92134 CPTRZ OPH DX IMG PST SGM RTA: CPT | Performed by: OPHTHALMOLOGY

## 2019-10-07 ASSESSMENT — INTRAOCULAR PRESSURE
OS: 13
OD: 11
OD: 13
OS: 12

## 2019-11-08 ENCOUNTER — CONSULT (OUTPATIENT)
Dept: URBAN - METROPOLITAN AREA CLINIC 30 | Facility: CLINIC | Age: 79
End: 2019-11-08
Payer: MEDICARE

## 2019-11-08 PROCEDURE — 92133 CPTRZD OPH DX IMG PST SGM ON: CPT | Performed by: OPHTHALMOLOGY

## 2019-11-08 PROCEDURE — 92014 COMPRE OPH EXAM EST PT 1/>: CPT | Performed by: OPHTHALMOLOGY

## 2019-11-08 PROCEDURE — 92250 FUNDUS PHOTOGRAPHY W/I&R: CPT | Performed by: OPHTHALMOLOGY

## 2019-11-08 PROCEDURE — 76514 ECHO EXAM OF EYE THICKNESS: CPT | Performed by: OPHTHALMOLOGY

## 2019-11-08 PROCEDURE — 92083 EXTENDED VISUAL FIELD XM: CPT | Performed by: OPHTHALMOLOGY

## 2019-11-08 ASSESSMENT — VISUAL ACUITY
OD: 20/200
OS: 20/60

## 2019-11-08 ASSESSMENT — INTRAOCULAR PRESSURE
OS: 16
OD: 19

## 2019-12-04 ENCOUNTER — Encounter (OUTPATIENT)
Dept: URBAN - METROPOLITAN AREA CLINIC 24 | Facility: CLINIC | Age: 79
End: 2019-12-04
Payer: MEDICARE

## 2019-12-04 PROCEDURE — 65855 TRABECULOPLASTY LASER SURG: CPT | Performed by: OPHTHALMOLOGY

## 2019-12-11 ENCOUNTER — Encounter (OUTPATIENT)
Dept: URBAN - METROPOLITAN AREA CLINIC 24 | Facility: CLINIC | Age: 79
End: 2019-12-11
Payer: MEDICARE

## 2019-12-11 PROCEDURE — 65855 TRABECULOPLASTY LASER SURG: CPT | Performed by: OPHTHALMOLOGY

## 2019-12-26 ENCOUNTER — FOLLOW UP ESTABLISHED (OUTPATIENT)
Dept: URBAN - METROPOLITAN AREA CLINIC 24 | Facility: CLINIC | Age: 79
End: 2019-12-26
Payer: MEDICARE

## 2019-12-26 PROCEDURE — 92242 FLUORESCEIN&ICG ANGIOGRAPHY: CPT | Performed by: OPHTHALMOLOGY

## 2019-12-26 PROCEDURE — 92250 FUNDUS PHOTOGRAPHY W/I&R: CPT | Performed by: OPHTHALMOLOGY

## 2019-12-26 ASSESSMENT — INTRAOCULAR PRESSURE
OS: 12
OD: 12

## 2020-02-05 ENCOUNTER — FOLLOW UP ESTABLISHED (OUTPATIENT)
Dept: URBAN - METROPOLITAN AREA CLINIC 30 | Facility: CLINIC | Age: 80
End: 2020-02-05
Payer: MEDICARE

## 2020-02-05 PROCEDURE — 92014 COMPRE OPH EXAM EST PT 1/>: CPT | Performed by: OPHTHALMOLOGY

## 2020-02-05 ASSESSMENT — INTRAOCULAR PRESSURE
OD: 21
OS: 15

## 2020-04-06 ENCOUNTER — FOLLOW UP ESTABLISHED (OUTPATIENT)
Dept: URBAN - METROPOLITAN AREA CLINIC 30 | Facility: CLINIC | Age: 80
End: 2020-04-06
Payer: MEDICARE

## 2020-04-06 PROCEDURE — 92242 FLUORESCEIN&ICG ANGIOGRAPHY: CPT | Performed by: OPHTHALMOLOGY

## 2020-04-06 PROCEDURE — 92134 CPTRZ OPH DX IMG PST SGM RTA: CPT | Performed by: OPHTHALMOLOGY

## 2020-04-06 ASSESSMENT — INTRAOCULAR PRESSURE
OD: 14
OS: 12

## 2020-04-22 ENCOUNTER — FOLLOW UP ESTABLISHED (OUTPATIENT)
Dept: URBAN - METROPOLITAN AREA CLINIC 30 | Facility: CLINIC | Age: 80
End: 2020-04-22
Payer: MEDICARE

## 2020-04-22 DIAGNOSIS — H25.813 COMBINED FORMS OF AGE-RELATED CATARACT, BILATERAL: Primary | ICD-10-CM

## 2020-04-22 PROCEDURE — 92014 COMPRE OPH EXAM EST PT 1/>: CPT | Performed by: OPTOMETRIST

## 2020-04-22 PROCEDURE — 92134 CPTRZ OPH DX IMG PST SGM RTA: CPT | Performed by: OPTOMETRIST

## 2020-04-22 ASSESSMENT — INTRAOCULAR PRESSURE
OS: 19
OD: 20
OS: 15
OD: 21

## 2020-04-22 ASSESSMENT — VISUAL ACUITY
OS: 20/80
OD: 20/100

## 2020-04-22 ASSESSMENT — KERATOMETRY
OD: 43.27
OS: 42.77

## 2020-05-05 ENCOUNTER — Encounter (OUTPATIENT)
Dept: URBAN - METROPOLITAN AREA CLINIC 30 | Facility: CLINIC | Age: 80
End: 2020-05-05
Payer: MEDICARE

## 2020-05-05 DIAGNOSIS — H25.11 AGE-RELATED NUCLEAR CATARACT, RIGHT EYE: Primary | ICD-10-CM

## 2020-05-05 DIAGNOSIS — Z01.818 ENCOUNTER FOR OTHER PREPROCEDURAL EXAMINATION: Primary | ICD-10-CM

## 2020-05-05 PROCEDURE — 99213 OFFICE O/P EST LOW 20 MIN: CPT | Performed by: PHYSICIAN ASSISTANT

## 2020-05-08 ENCOUNTER — FOLLOW UP ESTABLISHED (OUTPATIENT)
Dept: URBAN - METROPOLITAN AREA CLINIC 24 | Facility: CLINIC | Age: 80
End: 2020-05-08
Payer: MEDICARE

## 2020-05-08 PROCEDURE — 92014 COMPRE OPH EXAM EST PT 1/>: CPT | Performed by: OPHTHALMOLOGY

## 2020-05-08 RX ORDER — OFLOXACIN 3 MG/ML
0.3 % SOLUTION/ DROPS OPHTHALMIC
Qty: 1 | Refills: 1 | Status: INACTIVE
Start: 2020-05-08 | End: 2020-07-27

## 2020-05-08 RX ORDER — KETOROLAC TROMETHAMINE 4 MG/ML
0.4 % SOLUTION/ DROPS OPHTHALMIC
Qty: 1 | Refills: 1 | Status: INACTIVE
Start: 2020-05-08 | End: 2020-08-12

## 2020-05-08 RX ORDER — FLUOROMETHOLONE 1 MG/ML
0.1 % SOLUTION/ DROPS OPHTHALMIC
Qty: 1 | Refills: 1 | Status: INACTIVE
Start: 2020-05-08 | End: 2020-08-12

## 2020-05-08 ASSESSMENT — INTRAOCULAR PRESSURE
OS: 16
OD: 20

## 2020-05-08 ASSESSMENT — VISUAL ACUITY
OD: 20/80
OS: 20/80

## 2020-06-04 ENCOUNTER — SURGERY (OUTPATIENT)
Dept: URBAN - METROPOLITAN AREA SURGERY 12 | Facility: SURGERY | Age: 80
End: 2020-06-04
Payer: MEDICARE

## 2020-06-04 PROCEDURE — 66174 TRLUML DIL AQ O/F CAN W/O ST: CPT | Performed by: OPHTHALMOLOGY

## 2020-06-04 PROCEDURE — 0191T INSERTION OF ANTERIOR SEGMENT AQUEOUS DRAINAGE DEVICE, W/OUT EXTRAOCULAR RESERVO: CPT | Performed by: OPHTHALMOLOGY

## 2020-06-04 PROCEDURE — 66984 XCAPSL CTRC RMVL W/O ECP: CPT | Performed by: OPHTHALMOLOGY

## 2020-06-05 ENCOUNTER — POST OP (OUTPATIENT)
Dept: URBAN - METROPOLITAN AREA CLINIC 24 | Facility: CLINIC | Age: 80
End: 2020-06-05

## 2020-06-05 PROCEDURE — 99024 POSTOP FOLLOW-UP VISIT: CPT | Performed by: OPHTHALMOLOGY

## 2020-06-05 ASSESSMENT — INTRAOCULAR PRESSURE
OS: 15
OD: 14

## 2020-06-11 ENCOUNTER — POST OP (OUTPATIENT)
Dept: URBAN - METROPOLITAN AREA CLINIC 30 | Facility: CLINIC | Age: 80
End: 2020-06-11
Payer: MEDICARE

## 2020-06-11 DIAGNOSIS — H43.811 VITREOUS DEGENERATION, RIGHT EYE: ICD-10-CM

## 2020-06-11 PROCEDURE — 92134 CPTRZ OPH DX IMG PST SGM RTA: CPT | Performed by: OPTOMETRIST

## 2020-06-11 PROCEDURE — 99024 POSTOP FOLLOW-UP VISIT: CPT | Performed by: OPTOMETRIST

## 2020-06-11 ASSESSMENT — VISUAL ACUITY
OS: 20/80
OD: 20/60

## 2020-06-11 ASSESSMENT — INTRAOCULAR PRESSURE
OD: 19
OS: 10

## 2020-07-01 ENCOUNTER — FOLLOW UP ESTABLISHED (OUTPATIENT)
Dept: URBAN - METROPOLITAN AREA CLINIC 24 | Facility: CLINIC | Age: 80
End: 2020-07-01
Payer: MEDICARE

## 2020-07-01 DIAGNOSIS — H25.13 AGE-RELATED NUCLEAR CATARACT, BILATERAL: Primary | ICD-10-CM

## 2020-07-01 DIAGNOSIS — H25.12 AGE-RELATED NUCLEAR CATARACT, LEFT EYE: ICD-10-CM

## 2020-07-01 DIAGNOSIS — H40.1121 PRIMARY OPEN-ANGLE GLAUCOMA, LEFT EYE, MILD STAGE: ICD-10-CM

## 2020-07-01 PROCEDURE — 92014 COMPRE OPH EXAM EST PT 1/>: CPT | Performed by: OPHTHALMOLOGY

## 2020-07-01 PROCEDURE — 99024 POSTOP FOLLOW-UP VISIT: CPT | Performed by: OPHTHALMOLOGY

## 2020-07-01 PROCEDURE — 99213 OFFICE O/P EST LOW 20 MIN: CPT | Performed by: PHYSICIAN ASSISTANT

## 2020-07-01 ASSESSMENT — INTRAOCULAR PRESSURE
OS: 13
OD: 15
OD: 15
OS: 13

## 2020-07-16 ENCOUNTER — SURGERY (OUTPATIENT)
Dept: URBAN - METROPOLITAN AREA SURGERY 12 | Facility: SURGERY | Age: 80
End: 2020-07-16
Payer: MEDICARE

## 2020-07-16 PROCEDURE — 66174 TRLUML DIL AQ O/F CAN W/O ST: CPT | Performed by: OPHTHALMOLOGY

## 2020-07-16 PROCEDURE — 0191T INSERTION OF ANTERIOR SEGMENT AQUEOUS DRAINAGE DEVICE, W/OUT EXTRAOCULAR RESERVO: CPT | Performed by: OPHTHALMOLOGY

## 2020-07-17 ENCOUNTER — POST OP (OUTPATIENT)
Dept: URBAN - METROPOLITAN AREA CLINIC 24 | Facility: CLINIC | Age: 80
End: 2020-07-17

## 2020-07-17 PROCEDURE — 99024 POSTOP FOLLOW-UP VISIT: CPT | Performed by: OPHTHALMOLOGY

## 2020-07-17 ASSESSMENT — INTRAOCULAR PRESSURE
OS: 13
OD: 13

## 2020-07-22 ENCOUNTER — POST OP (OUTPATIENT)
Dept: URBAN - METROPOLITAN AREA CLINIC 30 | Facility: CLINIC | Age: 80
End: 2020-07-22

## 2020-07-22 PROCEDURE — 99024 POSTOP FOLLOW-UP VISIT: CPT | Performed by: OPHTHALMOLOGY

## 2020-07-22 ASSESSMENT — INTRAOCULAR PRESSURE
OS: 36
OD: 18

## 2020-07-27 ENCOUNTER — FOLLOW UP ESTABLISHED (OUTPATIENT)
Dept: URBAN - METROPOLITAN AREA CLINIC 30 | Facility: CLINIC | Age: 80
End: 2020-07-27
Payer: MEDICARE

## 2020-07-27 PROCEDURE — 92134 CPTRZ OPH DX IMG PST SGM RTA: CPT | Performed by: OPHTHALMOLOGY

## 2020-07-27 PROCEDURE — 92014 COMPRE OPH EXAM EST PT 1/>: CPT | Performed by: OPHTHALMOLOGY

## 2020-07-27 PROCEDURE — 65800 DRAINAGE OF EYE: CPT | Performed by: OPHTHALMOLOGY

## 2020-07-27 ASSESSMENT — INTRAOCULAR PRESSURE
OD: 16
OD: 12
OS: 18
OS: 12

## 2020-08-12 ENCOUNTER — POST OP (OUTPATIENT)
Dept: URBAN - METROPOLITAN AREA CLINIC 24 | Facility: CLINIC | Age: 80
End: 2020-08-12
Payer: MEDICARE

## 2020-08-12 PROCEDURE — 99024 POSTOP FOLLOW-UP VISIT: CPT | Performed by: OPHTHALMOLOGY

## 2020-08-12 ASSESSMENT — VISUAL ACUITY
OS: 20/20
OD: 20/50

## 2020-08-12 ASSESSMENT — INTRAOCULAR PRESSURE
OD: 15
OS: 16

## 2020-09-03 ENCOUNTER — POST OP (OUTPATIENT)
Dept: URBAN - METROPOLITAN AREA CLINIC 30 | Facility: CLINIC | Age: 80
End: 2020-09-03
Payer: MEDICARE

## 2020-09-03 PROCEDURE — 92015 DETERMINE REFRACTIVE STATE: CPT | Performed by: OPTOMETRIST

## 2020-09-03 PROCEDURE — 99024 POSTOP FOLLOW-UP VISIT: CPT | Performed by: OPTOMETRIST

## 2020-09-03 ASSESSMENT — KERATOMETRY
OD: 43.08
OS: 43.31

## 2020-09-03 ASSESSMENT — VISUAL ACUITY
OS: 20/25
OD: 20/40

## 2020-09-03 ASSESSMENT — INTRAOCULAR PRESSURE
OS: 15
OD: 15

## 2020-10-14 ENCOUNTER — FOLLOW UP ESTABLISHED (OUTPATIENT)
Dept: URBAN - METROPOLITAN AREA CLINIC 30 | Facility: CLINIC | Age: 80
End: 2020-10-14
Payer: MEDICARE

## 2020-10-14 DIAGNOSIS — H40.1112 PRIMARY OPEN-ANGLE GLAUCOMA, RIGHT EYE, MODERATE STAGE: Primary | ICD-10-CM

## 2020-10-14 PROCEDURE — 92014 COMPRE OPH EXAM EST PT 1/>: CPT | Performed by: OPHTHALMOLOGY

## 2020-10-14 PROCEDURE — 92083 EXTENDED VISUAL FIELD XM: CPT | Performed by: OPHTHALMOLOGY

## 2020-10-14 PROCEDURE — 92133 CPTRZD OPH DX IMG PST SGM ON: CPT | Performed by: OPHTHALMOLOGY

## 2020-10-14 RX ORDER — DORZOLAMIDE HCL 20 MG/ML
2 % SOLUTION/ DROPS OPHTHALMIC
Qty: 1 | Refills: 1 | Status: INACTIVE
Start: 2020-10-14 | End: 2021-03-18

## 2020-10-14 ASSESSMENT — INTRAOCULAR PRESSURE
OD: 13
OS: 17

## 2020-11-16 ENCOUNTER — FOLLOW UP ESTABLISHED (OUTPATIENT)
Dept: URBAN - METROPOLITAN AREA CLINIC 30 | Facility: CLINIC | Age: 80
End: 2020-11-16
Payer: MEDICARE

## 2020-11-16 PROCEDURE — 65800 DRAINAGE OF EYE: CPT | Performed by: OPHTHALMOLOGY

## 2020-11-16 PROCEDURE — 92134 CPTRZ OPH DX IMG PST SGM RTA: CPT | Performed by: OPHTHALMOLOGY

## 2020-11-16 PROCEDURE — 92014 COMPRE OPH EXAM EST PT 1/>: CPT | Performed by: OPHTHALMOLOGY

## 2020-11-16 ASSESSMENT — INTRAOCULAR PRESSURE
OD: 11
OD: 13
OS: 13
OS: 11

## 2020-12-09 ENCOUNTER — FOLLOW UP ESTABLISHED (OUTPATIENT)
Dept: URBAN - METROPOLITAN AREA CLINIC 30 | Facility: CLINIC | Age: 80
End: 2020-12-09
Payer: MEDICARE

## 2020-12-09 PROCEDURE — 92012 INTRM OPH EXAM EST PATIENT: CPT | Performed by: OPHTHALMOLOGY

## 2020-12-09 ASSESSMENT — INTRAOCULAR PRESSURE
OS: 15
OD: 15

## 2021-03-08 ENCOUNTER — FOLLOW UP ESTABLISHED (OUTPATIENT)
Dept: URBAN - METROPOLITAN AREA CLINIC 30 | Facility: CLINIC | Age: 81
End: 2021-03-08
Payer: MEDICARE

## 2021-03-08 PROCEDURE — 92242 FLUORESCEIN&ICG ANGIOGRAPHY: CPT | Performed by: OPHTHALMOLOGY

## 2021-03-08 PROCEDURE — 92250 FUNDUS PHOTOGRAPHY W/I&R: CPT | Performed by: OPHTHALMOLOGY

## 2021-03-08 ASSESSMENT — INTRAOCULAR PRESSURE
OD: 8
OS: 9

## 2021-04-28 ENCOUNTER — OFFICE VISIT (OUTPATIENT)
Dept: URBAN - METROPOLITAN AREA CLINIC 30 | Facility: CLINIC | Age: 81
End: 2021-04-28
Payer: MEDICARE

## 2021-04-28 PROCEDURE — 99213 OFFICE O/P EST LOW 20 MIN: CPT | Performed by: OPHTHALMOLOGY

## 2021-04-28 ASSESSMENT — INTRAOCULAR PRESSURE
OS: 14
OD: 13

## 2021-04-28 NOTE — IMPRESSION/PLAN
Impression: Primary open-angle glaucoma, moderate stage, right eye
-- Trabeculectomy OD 9/12/18 // OS  11/28/18, Wet AMD, S/P Trab 2018  OU Dr. Susy Culver -- x/p CE IOL + Canaloplasty + iStent  - Dr Dwayne Scheuermann - Mac Muminnie 06/04/2020 /OS 7/16/20
-- (allergic to Rhopressa) Plan: Pt has Glaucoma    Gonio :  Open to CBB      Pachs:549/555     Today's IOP :   13/14     Tmax  :  40, 43 Target IOP low to mid teens Pt denies Family hx of Glaucoma Left eye is the better seeing eye HVF (11/8/19) OD: Inferior Step OS: Nasal defect C/D:0.9-0.85/0.9-0.9 Pt denies Sulfa Allergy   // Pt denies Lung /Heart dx Pt is currently using : Timolol QAM OU Plan : 
1. Continue TImolol QAM OU 
ADD Dorzolamide BID OS (add 10/14/2020 due to scarred bleb & high IOP with visual loss progression.) 2. Patient's IOP is elevated OS. Discussed that with the scarred down bleb, it is no longer functioning and will add new medication and return.  
3. Return in 6 months with Dr. Dwayne Scheuermann for IOP, VF< RNFL (alternating care with Dr Afua Nix)

## 2021-06-14 ENCOUNTER — OFFICE VISIT (OUTPATIENT)
Dept: URBAN - METROPOLITAN AREA CLINIC 30 | Facility: CLINIC | Age: 81
End: 2021-06-14
Payer: MEDICARE

## 2021-06-14 PROCEDURE — 65800 DRAINAGE OF EYE: CPT | Performed by: OPHTHALMOLOGY

## 2021-06-14 PROCEDURE — 99214 OFFICE O/P EST MOD 30 MIN: CPT | Performed by: OPHTHALMOLOGY

## 2021-06-14 PROCEDURE — 92134 CPTRZ OPH DX IMG PST SGM RTA: CPT | Performed by: OPHTHALMOLOGY

## 2021-06-14 ASSESSMENT — INTRAOCULAR PRESSURE
OS: 14
OD: 12

## 2021-06-14 NOTE — IMPRESSION/PLAN
Impression: Open-angle glaucoma, mild  - [place] Plan: Mod C/d.   KEEP IOP f/u w Dr. Carine Shea et al.   PCIOL imprv'd [facility]/Fx[facility]  - AC tap OU to 12

## 2021-08-23 ENCOUNTER — OFFICE VISIT (OUTPATIENT)
Dept: URBAN - METROPOLITAN AREA CLINIC 24 | Facility: CLINIC | Age: 81
End: 2021-08-23
Payer: MEDICARE

## 2021-08-23 PROCEDURE — 99213 OFFICE O/P EST LOW 20 MIN: CPT | Performed by: OPHTHALMOLOGY

## 2021-08-23 PROCEDURE — 92083 EXTENDED VISUAL FIELD XM: CPT | Performed by: OPHTHALMOLOGY

## 2021-08-23 ASSESSMENT — INTRAOCULAR PRESSURE
OS: 17
OD: 13

## 2021-08-23 NOTE — IMPRESSION/PLAN
Impression: Primary open-angle glaucoma, moderate stage, right eye
-- Trabeculectomy OD 9/12/18 // OS  11/28/18, Wet AMD, S/P Trab 2018  OU Dr. Sami Tracy -- x/p CE IOL + Canaloplasty + iStent  - Dr Domingo Nieves - Belen Asad 06/04/2020 /OS 7/16/20
-- (allergic to Rhopressa) Plan: Pt has Glaucoma    Gonio :  Open to CBB      Pachs:549/555     Today's IOP :   13/17     Tmax  :  40, 43 Target IOP low to mid teens Pt denies Family hx of Glaucoma Left eye is the better seeing eye [facility] 8/23/21 (*Progression from previous) OD: Inferior Step OS: Nasal defect C/D:0.9-0.85/0.9-0.9 Pt denies Sulfa Allergy   // Pt denies Lung /Heart dx Plan : 
1. Continue Timolol QAM OU Dorzolamide BID OS (add 10/14/2020 due to scarred bleb & high IOP with visual loss progression.) 2. Patient's IOP is  OS. Discussed that with the scarred down bleb, it is no longer functioning and will add new medication and return.  
3. Return in 4 months with Dr. Domingo Nieves for IOP & Repeat VF (alternating care with Dr Evie Diamond)

## 2021-08-23 NOTE — IMPRESSION/PLAN
Impression: WET AMD OS then OD -AREDS2, diet, non- smoker, Amsler  -- Stable w injx.   Maintain Plan: Continue care with Dr. Sonya Nails as scheduled

## 2021-09-20 ENCOUNTER — OFFICE VISIT (OUTPATIENT)
Dept: URBAN - METROPOLITAN AREA CLINIC 30 | Facility: CLINIC | Age: 81
End: 2021-09-20
Payer: MEDICARE

## 2021-09-20 PROCEDURE — 92134 CPTRZ OPH DX IMG PST SGM RTA: CPT | Performed by: OPHTHALMOLOGY

## 2021-09-20 PROCEDURE — 65800 DRAINAGE OF EYE: CPT | Performed by: OPHTHALMOLOGY

## 2021-09-20 ASSESSMENT — INTRAOCULAR PRESSURE
OD: 12
OD: 10
OS: 12
OS: 10

## 2021-09-20 NOTE — IMPRESSION/PLAN
Impression: Primary open-angle glaucoma, mild  - NSC Plan: TODAY stable nerves -  REPEATED exam -- AC tap OU to 10 (proc note) Mod C/d.      KEEP IOP f/u w Dr. Raya mayen   PCIOL imprv'd [facility]/Fxn

## 2021-09-20 NOTE — IMPRESSION/PLAN
Impression: WET AMD OS then OD -AREDS2, diet, non- smoker, Amsler  -- Stable w injx. Maintain Plan: Hx: [[OU now wet AMD. Extend '17. AREDS2, diet, fish, Amsler, non-smoking]]. TODAY  Avstn OU (proc. note)      Future ND? Maintain q4mo    RTC EXTEND 14-16w Pos HRA / plan Avstn OU (w AC tap -INJ INFERIOR d/t Trab)

## 2021-12-15 ENCOUNTER — OFFICE VISIT (OUTPATIENT)
Dept: URBAN - METROPOLITAN AREA CLINIC 24 | Facility: CLINIC | Age: 81
End: 2021-12-15
Payer: MEDICARE

## 2021-12-15 PROCEDURE — 92083 EXTENDED VISUAL FIELD XM: CPT | Performed by: OPHTHALMOLOGY

## 2021-12-15 PROCEDURE — 92133 CPTRZD OPH DX IMG PST SGM ON: CPT | Performed by: OPHTHALMOLOGY

## 2021-12-15 PROCEDURE — 99213 OFFICE O/P EST LOW 20 MIN: CPT | Performed by: OPHTHALMOLOGY

## 2021-12-15 ASSESSMENT — INTRAOCULAR PRESSURE
OS: 20
OD: 13

## 2021-12-15 NOTE — IMPRESSION/PLAN
Impression: WET AMD OS then OD -AREDS2, diet, non- smoker, Amsler  -- Stable w injx.   Maintain Plan: Continue care with Dr. Joan Angel as scheduled

## 2021-12-15 NOTE — IMPRESSION/PLAN
Impression: Primary open-angle glaucoma, moderate stage, right eye
-- Trabeculectomy OD 9/12/18 // OS  11/28/18, Wet AMD, S/P Trab 2018  OU Dr. Nedra Camacho -- x/p CE IOL + Canaloplasty + iStent  - Dr Mark Coy - OD 06/04/2020 /OS 7/16/20
-- (allergic to Rhopressa) Plan: Pt has Glaucoma    Gonio :  Open to CBB      Pachs:549/555     Today's IOP :   13/20    Tmax  :  40, 43 Target IOP low to mid teens Pt denies Family hx of Glaucoma Left eye is the better seeing eye HVF 12/15/21 OD: Inferior Step OS: Nasal defect - 12/15/21: 
C/D:0.9-0.85/0.9-0.9
OCT: 53/68 12/15/21 Pt denies Sulfa Allergy   // Pt denies Lung /Heart dx Plan : 
1. Continue Timolol QAM OU Dorzolamide BID OS (added 10/14/2020 due to scarred bleb & high IOP with visual loss progression.) 2. Patient's IOP is  OS. Discussed that with the scarred down bleb, it is no longer functioning and will add new medication and return.  
3. Return to Dr. Tuan Roger as scheduled (pt to ask about IOP at this exam) and in 3 months for IOP check with Dr. Mark Coy - If IOP still elevated OS then plan bleb revision with Xen placement

## 2022-01-12 ENCOUNTER — OFFICE VISIT (OUTPATIENT)
Dept: URBAN - METROPOLITAN AREA CLINIC 24 | Facility: CLINIC | Age: 82
End: 2022-01-12
Payer: MEDICARE

## 2022-01-12 PROCEDURE — 92250 FUNDUS PHOTOGRAPHY W/I&R: CPT | Performed by: OPHTHALMOLOGY

## 2022-01-12 PROCEDURE — 92134 CPTRZ OPH DX IMG PST SGM RTA: CPT | Performed by: OPHTHALMOLOGY

## 2022-01-12 PROCEDURE — 92242 FLUORESCEIN&ICG ANGIOGRAPHY: CPT | Performed by: OPHTHALMOLOGY

## 2022-01-12 ASSESSMENT — INTRAOCULAR PRESSURE
OS: 14
OD: 10
OD: 13
OS: 10

## 2022-01-12 NOTE — IMPRESSION/PLAN
Impression: Primary open-angle glaucoma, mild  - NSC Plan: PCIOL imprv'd [facility]/Fxn  - MUST KEEP IOP f/u w Dr. Camille Randle et alJaved Perez tap OU to 10 (proc note) Mod C/d.

## 2022-01-12 NOTE — IMPRESSION/PLAN
Impression: WET AMD OS then OD -AREDS2, diet, non- smoker, Amsler  -- Stable w injx. Maintain Plan: Hx: [[OU now wet AMD. Extend '17. AREDS2, diet, fish, Amsler, non-smoking]]. TODAY  Avstn OU (proc. note)      Future  Exam?    Maintain q4mo    RTC EXTND 16w ND/inj/OCT plan Avstn OU (AC tap -INJ INFERIOR d/t Trab)

## 2022-01-12 NOTE — IMPRESSION/PLAN
Impression: Primary open-angle glaucoma, mild  - NSC Plan: TODAY stable nerves -  REPEATED exam -- AC tap OU to 10 (proc note) Mod C/d.      KEEP IOP f/u w Dr. Marcos mayen   PCIOL imprv'd [facility]/Fxn

## 2022-03-24 ENCOUNTER — OFFICE VISIT (OUTPATIENT)
Dept: URBAN - METROPOLITAN AREA CLINIC 24 | Facility: CLINIC | Age: 82
End: 2022-03-24
Payer: MEDICARE

## 2022-03-24 DIAGNOSIS — Z96.1 PRESENCE OF INTRAOCULAR LENS: ICD-10-CM

## 2022-03-24 PROCEDURE — 99213 OFFICE O/P EST LOW 20 MIN: CPT | Performed by: OPHTHALMOLOGY

## 2022-03-24 ASSESSMENT — INTRAOCULAR PRESSURE
OS: 18
OD: 13

## 2022-03-24 NOTE — IMPRESSION/PLAN
Impression: WET AMD OS then OD -AREDS2, diet, non- smoker, Amsler  -- Stable w injx.   Maintain Plan: Continue care with Dr. Mark Landry as scheduled

## 2022-03-24 NOTE — IMPRESSION/PLAN
Impression: Primary open-angle glaucoma, moderate stage, right eye
-- Trabeculectomy OD 9/12/18 // OS  11/28/18, Wet AMD, S/P Trab 2018  OU Dr. Lito Duron -- x/p CE IOL + Canaloplasty + iStent  - Dr Christine Valenzuela - OD 06/04/2020 /OS 7/16/20
-- (allergic to Rhopressa) Plan: Pt has Glaucoma    Gonio :  Open to CBB      Pachs:549/555     Today's IOP :   13/18    Tmax  :  40, 43 Target IOP low to mid teens Pt denies Family hx of Glaucoma Left eye is the better seeing eye HVF 12/15/21 OD: Inferior Step OS: Nasal defect - 12/15/21: 
C/D:0.9-0.85/0.9-0.9
OCT: 51/64 03/24/22 Pt denies Sulfa Allergy // Pt denies Lung /Heart dx Plan : 
1. Continue Timolol QAM OU Dorzolamide BID OS (added 10/14/2020 due to scarred bleb & high IOP with visual loss progression.) 2. Patient's IOP is still elevated OS. Discussed that with the scarred down bleb, it is no longer functioning 3.  Return to Dr. Denise Barrientos as scheduled (pt to ask about IOP at this exam) and in 6 months for IOP check and HVF OS only with Dr. Christine Valenzuela - If worsening on HVF OS then plan bleb revision with Xen placement

## 2022-03-24 NOTE — IMPRESSION/PLAN
Impression: WET AMD OS then OD -AREDS2, diet, non- smoker, Amsler  -- Stable w injx. Maintain Plan: Followed by Dr. Eduarda Rosado.

## 2022-05-04 ENCOUNTER — OFFICE VISIT (OUTPATIENT)
Dept: URBAN - METROPOLITAN AREA CLINIC 24 | Facility: CLINIC | Age: 82
End: 2022-05-04
Payer: MEDICARE

## 2022-05-04 DIAGNOSIS — H35.3231 EXUDATIVE AGE-RELATED MACULAR DEGENERATION, BILATERAL, WITH ACTIVE CHOROIDAL NEOVASCULARIZATION: Primary | ICD-10-CM

## 2022-05-04 PROCEDURE — 92134 CPTRZ OPH DX IMG PST SGM RTA: CPT | Performed by: OPHTHALMOLOGY

## 2022-05-04 ASSESSMENT — INTRAOCULAR PRESSURE
OD: 9
OS: 12

## 2022-05-04 NOTE — IMPRESSION/PLAN
Impression: WET AMD OS then OD -AREDS2, diet, non- smoker, Amsler  -- Stable w injx. Maintain Plan: Hx: [[OU now wet AMD. Extend '17. AREDS2, diet, fish, Amsler, non-smoking]]. TODAY  Avstn OU (proc. note)      Future  HRA? Maintain q4mo    RTC EXTND 16w Dil, injx, OCT eval h/o  Avstn OU (AC tap -INJ INFERIOR d/t Trab)

## 2022-08-29 ENCOUNTER — OFFICE VISIT (OUTPATIENT)
Dept: URBAN - METROPOLITAN AREA CLINIC 24 | Facility: CLINIC | Age: 82
End: 2022-08-29
Payer: MEDICARE

## 2022-08-29 DIAGNOSIS — H40.1132 PRIMARY OPEN-ANGLE GLAUCOMA, BILATERAL, MODERATE STAGE: ICD-10-CM

## 2022-08-29 DIAGNOSIS — H35.3231 EXUDATIVE AGE-RELATED MACULAR DEGENERATION, BILATERAL, WITH ACTIVE CHOROIDAL NEOVASCULARIZATION: Primary | ICD-10-CM

## 2022-08-29 PROCEDURE — 92134 CPTRZ OPH DX IMG PST SGM RTA: CPT | Performed by: OPHTHALMOLOGY

## 2022-08-29 PROCEDURE — 99214 OFFICE O/P EST MOD 30 MIN: CPT | Performed by: OPHTHALMOLOGY

## 2022-08-29 ASSESSMENT — INTRAOCULAR PRESSURE
OS: 16
OD: 13

## 2022-08-29 NOTE — IMPRESSION/PLAN
Impression: WET AMD OS then OD -AREDS2, diet, non- smoker, Amsler  -- Stable w injx. Maintain Plan: Hx: [[OU now wet AMD. Extend '17. AREDS2, diet, fish, Amsler, non-smoking]]. TODAY  Avstn OU (proc. note)      Future ND? Maintain q4mo    RTC EXTND 16w pos HRA / plan Avstn OU (AC tap -INJ INFERIOR d/t Trab)

## 2022-08-29 NOTE — IMPRESSION/PLAN
Impression: Glaucoma, mild  - NSC Plan: TODAY REPEATED exam -- No need for AC tap  -- Mod C/d.      KEEP IOP f/u w Dr. Donato mayen   PCIOL imprv'd [facility]/Fxn

## 2022-09-07 ENCOUNTER — OFFICE VISIT (OUTPATIENT)
Dept: URBAN - METROPOLITAN AREA CLINIC 24 | Facility: CLINIC | Age: 82
End: 2022-09-07
Payer: MEDICARE

## 2022-09-07 DIAGNOSIS — H35.3231 EXUDATIVE AGE-RELATED MACULAR DEGENERATION, BILATERAL, WITH ACTIVE CHOROIDAL NEOVASCULARIZATION: ICD-10-CM

## 2022-09-07 DIAGNOSIS — H40.1112 PRIMARY OPEN-ANGLE GLAUCOMA, RIGHT EYE, MODERATE STAGE: Primary | ICD-10-CM

## 2022-09-07 DIAGNOSIS — Z96.1 PRESENCE OF INTRAOCULAR LENS: ICD-10-CM

## 2022-09-07 PROCEDURE — 92083 EXTENDED VISUAL FIELD XM: CPT | Performed by: OPHTHALMOLOGY

## 2022-09-07 PROCEDURE — 99214 OFFICE O/P EST MOD 30 MIN: CPT | Performed by: OPHTHALMOLOGY

## 2022-09-07 ASSESSMENT — INTRAOCULAR PRESSURE
OD: 15
OS: 25

## 2022-09-07 NOTE — IMPRESSION/PLAN
Impression: WET AMD OS then OD -AREDS2, diet, non- smoker, Amsler  -- Stable w injx. Maintain Plan: Followed by Dr. Joan Angel.

## 2022-09-07 NOTE — IMPRESSION/PLAN
Impression: WET AMD OS then OD -AREDS2, diet, non- smoker, Amsler  -- Stable w injx.   Maintain Plan: Continue care with Dr. Lauren Hansen as scheduled

## 2022-09-07 NOTE — IMPRESSION/PLAN
Impression: Primary open-angle glaucoma, moderate stage, right eye
-- Trabeculectomy OD 9/12/18 // OS  11/28/18, Wet AMD, S/P Trab 2018  OU Dr. Debbie Hatchet -- x/p CE IOL + Canaloplasty + iStent  - Dr Batool Steele - OD 06/04/2020 /OS 7/16/20
-- (allergic to Rhopressa) Plan: Pt has Glaucoma    Gonio :  Open to CBB      Pachs: 549/555     Today's IOP : 15/25    Tmax  :  40, 43 Target IOP low to mid teens Pt denies Family hx of Glaucoma Left eye is the better seeing eye HVF: (9/7/22) OD: Inferior Step OS: Nasal defect - 12/15/21: 
C/D:0.9-0.85/0.9-0.9
OCT: 51/64 03/24/22 Pt denies Sulfa Allergy // Pt denies Lung /Heart dx Plan : 
1. Continue Timolol QAM OU Dorzolamide BID OS (added 10/14/2020 due to scarred bleb & high IOP with visual loss progression.) 2. Patient's IOP is still elevated OS. Discussed that with the scarred down bleb, it is no longer functioning 3. Pt deferred bleb revision at this time. Pt opts to have IOP rechecked in the near future to see if OS remains high. Consider Bleb revision with Xen or Canaloplasty with Hydrus (replace iStent) 4.  RTC in 1 month for IOP check and discuss sx again

## 2022-10-07 ENCOUNTER — OFFICE VISIT (OUTPATIENT)
Dept: URBAN - METROPOLITAN AREA CLINIC 24 | Facility: CLINIC | Age: 82
End: 2022-10-07
Payer: MEDICARE

## 2022-10-07 DIAGNOSIS — H40.1112 PRIMARY OPEN-ANGLE GLAUCOMA, RIGHT EYE, MODERATE STAGE: Primary | ICD-10-CM

## 2022-10-07 DIAGNOSIS — H35.3231 EXUDATIVE AGE-RELATED MACULAR DEGENERATION, BILATERAL, WITH ACTIVE CHOROIDAL NEOVASCULARIZATION: ICD-10-CM

## 2022-10-07 DIAGNOSIS — Z96.1 PRESENCE OF INTRAOCULAR LENS: ICD-10-CM

## 2022-10-07 PROCEDURE — 99214 OFFICE O/P EST MOD 30 MIN: CPT | Performed by: OPHTHALMOLOGY

## 2022-10-07 ASSESSMENT — INTRAOCULAR PRESSURE
OD: 15
OS: 21

## 2022-10-07 NOTE — IMPRESSION/PLAN
Impression: Primary open-angle glaucoma, moderate stage, right eye
-- Trabeculectomy OD 9/12/18 // OS  11/28/18, Wet AMD, S/P Trab 2018  OU Dr. Lola Wilks -- x/p CE IOL + Canaloplasty + iStent  - Dr Nader Brantley - OD 06/04/2020 /OS 7/16/20
-- (allergic to Rhopressa) Plan: Pt has Glaucoma    Gonio :  Open to CBB      Pachs: 549/555     Today's IOP : 15/21    Tmax  :  40, 43 Target IOP low to mid teens Pt denies Family hx of Glaucoma Left eye is the better seeing eye HVF: (9/7/22) OD: Inferior Step OS: Nasal defect - 12/15/21: 
C/D:0.9-0.85/0.9-0.9
OCT: 51/64 03/24/22 Pt denies Sulfa Allergy // Pt denies Lung /Heart dx Plan : 
1. Continue Timolol QAM OU Dorzolamide BID OS (added 10/14/2020 due to scarred bleb & high IOP with visual loss progression.) 2. Patient's IOP is still elevated OS. Discussed that with the scarred down bleb, it is no longer functioning 3. Second discussion about bleb revision OS - pt still hesitant about revision 4. Recommend revision with Xen OS - ab externo The Patient is aware that the risks of Xen include but are not limited to complete blindness , loss of vision and loss of the eye, bleeding, infection, inflammation, Hypotony, Persistent IOP elevation, intractable diplopia , and orbital or ocular inflammation. The Patient is aware that there is a 10 % chance of permanent double vision which cannot be remedied by any means including glasses or strabismus surgery. The patient is aware that failure to lower IOP will likely lead to progressive sight loss and possibly blindness . Pt understands and wishes to proceed. *20 minutes/points **PCC's Please notify OR to order device and Mitomycin 4% (allow 1 week for mitomycin to be ordered)

## 2022-10-07 NOTE — IMPRESSION/PLAN
Impression: WET AMD OS then OD -AREDS2, diet, non- smoker, Amsler  -- Stable w injx.   Maintain Plan: Continue care with Dr. Afua Nix as scheduled

## 2022-10-07 NOTE — IMPRESSION/PLAN
Impression: WET AMD OS then OD -AREDS2, diet, non- smoker, Amsler  -- Stable w injx. Maintain Plan: Followed by Dr. Segun De Jesus.

## 2022-11-02 ENCOUNTER — POST-OPERATIVE VISIT (OUTPATIENT)
Dept: URBAN - METROPOLITAN AREA CLINIC 23 | Facility: CLINIC | Age: 82
End: 2022-11-02
Payer: MEDICARE

## 2022-11-02 DIAGNOSIS — Z48.810 ENCOUNTER FOR SURGICAL AFTERCARE FOLLOWING SURGERY ON A SENSE ORGAN: Primary | ICD-10-CM

## 2022-11-02 PROCEDURE — 99024 POSTOP FOLLOW-UP VISIT: CPT | Performed by: OPTOMETRIST

## 2022-11-02 ASSESSMENT — INTRAOCULAR PRESSURE
OD: 15
OS: 12

## 2022-11-02 NOTE — IMPRESSION/PLAN
Impression: S/P Bleb Revision with XEN OS - 1 Day. Encounter for surgical aftercare following surgery on a sense organ  Z48.810. Post operative instructions reviewed - Plan: Use medication as directed above and on handout. Advised patient to continue glaucoma medications as previously directed. Return if vision suddenly changes or pain increases.

## 2022-11-08 ENCOUNTER — OFFICE VISIT (OUTPATIENT)
Dept: URBAN - METROPOLITAN AREA CLINIC 23 | Facility: CLINIC | Age: 82
End: 2022-11-08
Payer: MEDICARE

## 2022-11-08 DIAGNOSIS — H35.3231 EXUDATIVE AGE-RELATED MACULAR DEGENERATION, BILATERAL, WITH ACTIVE CHOROIDAL NEOVASCULARIZATION: ICD-10-CM

## 2022-11-08 DIAGNOSIS — H40.1112 PRIMARY OPEN-ANGLE GLAUCOMA, RIGHT EYE, MODERATE STAGE: ICD-10-CM

## 2022-11-08 DIAGNOSIS — H40.1121 PRIMARY OPEN-ANGLE GLAUCOMA, LEFT EYE, MILD STAGE: Primary | ICD-10-CM

## 2022-11-08 PROCEDURE — 99024 POSTOP FOLLOW-UP VISIT: CPT | Performed by: OPHTHALMOLOGY

## 2022-11-08 ASSESSMENT — INTRAOCULAR PRESSURE
OS: 13
OD: 16

## 2022-11-08 NOTE — IMPRESSION/PLAN
Impression: WET AMD OS then OD -AREDS2, diet, non- smoker, Amsler  -- Stable w injx.   Maintain Plan: Continue care with Dr. Ming Villa as scheduled

## 2022-11-08 NOTE — IMPRESSION/PLAN
Impression: Primary open-angle glaucoma, moderate stage, right eye
-- Trabeculectomy OD 9/12/18 // OS  11/28/18, Wet AMD, S/P Trab 2018  OU Dr. Martita Parnell- S/P BLEB revision with Xen OS - 11/1/22
-- x/p CE IOL + Canaloplasty + iStent  - Dr Elizabeth Johansen [place] 06/04/2020 /OS 7/16/20
-- (allergic to Rhopressa) Plan: Pt has Glaucoma    Gonio :  Open to CBB      Pachs: 549/555     Today's IOP : 16/13    Tmax  :  40, 43 Target IOP low to mid teens Pt denies Family hx of Glaucoma Left eye is the better seeing eye HVF: (9/7/22) OD: Inferior Step OS: Nasal defect - 12/15/21: 
C/D:0.9-0.85/0.9-0.9
OCT: 51/64 03/24/22 Pt denies Sulfa Allergy // Pt denies Lung /Heart dx Plan : 
1. Continue Timolol QAM OU Dorzolamide BID OS (added 10/14/2020 due to scarred bleb & high IOP with visual loss progression.) 2. Patient's IOP is still elevated OS. Discussed that with the scarred down bleb, it is no longer functioning 3.  Second discussion about bleb revision OS - pt still hesitant about revision

## 2022-11-08 NOTE — IMPRESSION/PLAN
Impression: WET AMD OS then OD -AREDS2, diet, non- smoker, Amsler  -- Stable w injx. Maintain Plan: Followed by Dr. Prasanna Gould.

## 2022-11-08 NOTE — IMPRESSION/PLAN
Impression: Primary open-angle glaucoma, left eye, mild stage: H40.1121. Plan: S/P BLEB revision with Xen OS - ab externo Patient healing well. Continue post-operative medications as instructed. Will continue to monitor. Patient to keep post-operative appointments as instructed. Oflox to finish today Pred QID Until seen
rtc 3-4 weeks for IOP/PO

## 2022-11-30 ENCOUNTER — POST-OPERATIVE VISIT (OUTPATIENT)
Dept: URBAN - METROPOLITAN AREA CLINIC 24 | Facility: CLINIC | Age: 82
End: 2022-11-30
Payer: MEDICARE

## 2022-11-30 DIAGNOSIS — H40.1112 PRIMARY OPEN-ANGLE GLAUCOMA, RIGHT EYE, MODERATE STAGE: Primary | ICD-10-CM

## 2022-11-30 DIAGNOSIS — H35.3231 EXUDATIVE AGE-RELATED MACULAR DEGENERATION, BILATERAL, WITH ACTIVE CHOROIDAL NEOVASCULARIZATION: ICD-10-CM

## 2022-11-30 PROCEDURE — 99024 POSTOP FOLLOW-UP VISIT: CPT | Performed by: OPHTHALMOLOGY

## 2022-11-30 RX ORDER — TIMOLOL MALEATE 5 MG/ML
0.5 % SOLUTION/ DROPS OPHTHALMIC
Qty: 10 | Refills: 4 | Status: ACTIVE
Start: 2022-11-30

## 2022-11-30 RX ORDER — PREDNISOLONE ACETATE 10 MG/ML
1 % SUSPENSION/ DROPS OPHTHALMIC
Qty: 15 | Refills: 3 | Status: ACTIVE
Start: 2022-11-30

## 2022-11-30 ASSESSMENT — INTRAOCULAR PRESSURE
OD: 15
OS: 21

## 2022-11-30 NOTE — IMPRESSION/PLAN
Impression: WET AMD OS then OD -AREDS2, diet, non- smoker, Amsler  -- Stable w injx. Maintain Plan: Followed by Dr. Sonya Nails.

## 2022-11-30 NOTE — IMPRESSION/PLAN
Impression: WET AMD OS then OD -AREDS2, diet, non- smoker, Amsler  -- Stable w injx.   Maintain Plan: Continue care with Dr. Bubba Guzman as scheduled

## 2022-11-30 NOTE — IMPRESSION/PLAN
Impression: Primary open-angle glaucoma, moderate stage, right eye
-- Trabeculectomy OD 9/12/18 // OS  11/28/18, Wet AMD, S/P Trab 2018  OU Dr. Debbie Hatchet- S/P BLEB revision with Xen OS - 11/1/22
-- x/p CE IOL + Canaloplasty + iStent  - Dr Batool Steele - Sunita Climes 06/04/2020 /OS 7/16/20
-- (allergic to Rhopressa) Plan: Pt has Glaucoma    Gonio :  Open to CBB      Pachs: 549/555     Today's IOP : 15/21    Tmax  :  40, 43 Target IOP low to mid teens Pt denies Family hx of Glaucoma Left eye is the better seeing eye HVF: (9/7/22) OD: Inferior Step OS: Nasal defect - 12/15/21: 
C/D:0.9-0.85/0.9-0.9
OCT: 51/64 03/24/22 Pt denies Sulfa Allergy // Pt denies Lung /Heart dx Plan : 
1. Continue Timolol QAM OU Dorzolamide QAM OS Pred TID OS 2. Doing well - IOP slightly higher OS today but most likely steroid response 3.  Return in 3 weeks for IOP check

## 2022-12-19 ENCOUNTER — OFFICE VISIT (OUTPATIENT)
Dept: URBAN - METROPOLITAN AREA CLINIC 24 | Facility: CLINIC | Age: 82
End: 2022-12-19
Payer: MEDICARE

## 2022-12-19 DIAGNOSIS — H40.1132 PRIMARY OPEN-ANGLE GLAUCOMA, BILATERAL, MODERATE STAGE: ICD-10-CM

## 2022-12-19 PROCEDURE — 99214 OFFICE O/P EST MOD 30 MIN: CPT | Performed by: OPHTHALMOLOGY

## 2022-12-19 PROCEDURE — 92242 FLUORESCEIN&ICG ANGIOGRAPHY: CPT | Performed by: OPHTHALMOLOGY

## 2022-12-19 PROCEDURE — 92134 CPTRZ OPH DX IMG PST SGM RTA: CPT | Performed by: OPHTHALMOLOGY

## 2022-12-19 ASSESSMENT — INTRAOCULAR PRESSURE
OS: 21
OD: 16

## 2022-12-19 NOTE — IMPRESSION/PLAN
Impression: Glaucoma, mild  - [place]   BLEB revision OS Nov '22 Plan: TODAY REPEATED exam -- [facility]  -- Mod C/d - BLEB revision OS Nov '22  -- KEEP IOP f/u w Dr. Nader Brantley et al.   PCIOL imprv'd [facility]/Fxn
Impression: WET AMD OS then OD -AREDS2, diet, non- smoker, Amsler  -- Stable w injx. Maintain Plan: Hx: [[OU now wet AMD. Extend '17. AREDS2, diet, fish, Amsler, non-smoking]]. TODAY  Avstn OU (proc. note)      Future exam?    Maintain q4mo    RTC EXTND 16w ND/inj/OCT -- plan Avstn OU (AC tap -INJ INFERIOR d/t Trab)
2

## 2022-12-20 ENCOUNTER — POST-OPERATIVE VISIT (OUTPATIENT)
Dept: URBAN - METROPOLITAN AREA CLINIC 24 | Facility: CLINIC | Age: 82
End: 2022-12-20
Payer: MEDICARE

## 2022-12-20 DIAGNOSIS — H35.3231 EXUDATIVE AGE-RELATED MACULAR DEGENERATION, BILATERAL, WITH ACTIVE CHOROIDAL NEOVASCULARIZATION: ICD-10-CM

## 2022-12-20 DIAGNOSIS — H40.1112 PRIMARY OPEN-ANGLE GLAUCOMA, RIGHT EYE, MODERATE STAGE: Primary | ICD-10-CM

## 2022-12-20 PROCEDURE — 99024 POSTOP FOLLOW-UP VISIT: CPT | Performed by: OPHTHALMOLOGY

## 2022-12-20 ASSESSMENT — INTRAOCULAR PRESSURE
OS: 14
OD: 13

## 2022-12-20 NOTE — IMPRESSION/PLAN
Impression: WET AMD OS then OD -AREDS2, diet, non- smoker, Amsler  -- Stable w injx.   Maintain Plan: Continue care with Dr. Larisa Blake as scheduled

## 2022-12-20 NOTE — IMPRESSION/PLAN
Impression: Primary open-angle glaucoma, moderate stage, right eye
-- Trabeculectomy OD 9/12/18 // OS  11/28/18, Wet AMD, S/P Trab 2018  OU Dr. Kaylin Luque- S/P BLEB revision with Xen OS - 11/1/22
-- x/p CE IOL + Canaloplasty + iStent  - Dr Alexis Chase - Floyd Chambers 06/04/2020 /OS 7/16/20
-- (allergic to Rhopressa) Plan: Pt has Glaucoma    Gonio :  Open to CBB      Pachs: 549/555     Today's IOP : 13/14    Tmax  :  40, 43 Target IOP low to mid teens Pt denies Family hx of Glaucoma Left eye is the better seeing eye HVF: (9/7/22) OD: Inferior Step OS: Nasal defect - 12/15/21: 
C/D:0.9-0.85/0.9-0.9
OCT: 51/64 03/24/22 Pt denies Sulfa Allergy // Pt denies Lung /Heart dx Plan : 
1. Continue Timolol QAM OU Dorzolamide QAM OS INCREASE Pred TID OS x 1 week, then TAPER BID x 2 weeks, then QD x 2 weeks. 2. Doing well - IOP is lower OS 3. Pt has concerns of swelling OS. Pt decreased Pred to BID OS on her own due to swelling concerns. Discussed that one of her stitches likely came out and caused irritation. Reassured pt that her eye is fine and that her new bleb is doing well. Advised pt to increase Pred to TID OS for 1 week, then TAPER to BID OS x 2 weeks, then QD x 2 weeks. 
4. RTC in 6 weeks for IOP check w/ optom

## 2023-02-01 ENCOUNTER — OFFICE VISIT (OUTPATIENT)
Dept: URBAN - METROPOLITAN AREA CLINIC 24 | Facility: CLINIC | Age: 83
End: 2023-02-01
Payer: MEDICARE

## 2023-02-01 DIAGNOSIS — H40.1112 PRIMARY OPEN-ANGLE GLAUCOMA, RIGHT EYE, MODERATE STAGE: Primary | ICD-10-CM

## 2023-02-01 PROCEDURE — 92012 INTRM OPH EXAM EST PATIENT: CPT | Performed by: OPTOMETRIST

## 2023-02-01 ASSESSMENT — INTRAOCULAR PRESSURE
OS: 17
OD: 16

## 2023-02-01 NOTE — IMPRESSION/PLAN
Impression: Primary open-angle glaucoma, moderate stage, right eye
-- Trabeculectomy OD 9/12/18 // OS  11/28/18, Wet AMD, S/P Trab 2018  OU Dr. Gildardo Hodgkin- S/P BLEB revision with Xen OS - 11/1/22
-- x/p CE IOL + Canaloplasty + iStent  - Dr Cira Flores - Kiel Pitch 06/04/2020 /OS 7/16/20
-- (allergic to Rhopressa) Plan: IOP reasonable today; pt compliant with gtts. Continue Timolol QAM OU and Dorzolamide BID OS. Under the care of Dr. Cira Flores, keep all appts as scheduled.

## 2023-03-28 ENCOUNTER — OFFICE VISIT (OUTPATIENT)
Dept: URBAN - METROPOLITAN AREA CLINIC 23 | Facility: CLINIC | Age: 83
End: 2023-03-28
Payer: MEDICARE

## 2023-03-28 DIAGNOSIS — H35.3231 EXUDATIVE AGE-RELATED MACULAR DEGENERATION, BILATERAL, WITH ACTIVE CHOROIDAL NEOVASCULARIZATION: ICD-10-CM

## 2023-03-28 DIAGNOSIS — H40.1112 PRIMARY OPEN-ANGLE GLAUCOMA, RIGHT EYE, MODERATE STAGE: Primary | ICD-10-CM

## 2023-03-28 PROCEDURE — 99213 OFFICE O/P EST LOW 20 MIN: CPT | Performed by: OPHTHALMOLOGY

## 2023-03-28 ASSESSMENT — INTRAOCULAR PRESSURE
OD: 15
OS: 19

## 2023-03-28 NOTE — IMPRESSION/PLAN
Impression: WET AMD OS then OD -AREDS2, diet, non- smoker, Amsler  -- Stable w injx.   Maintain Plan: Continue care with Dr. Joanna Sommer as scheduled

## 2023-03-28 NOTE — IMPRESSION/PLAN
Impression: Primary open-angle glaucoma, moderate stage, right eye
-- Trabeculectomy OD 9/12/18 // OS  11/28/18, Wet AMD, S/P Trab 2018  OU Dr. Tyree Cole- S/P BLEB revision with Xen OS - 11/1/22
-- x/p CE IOL + Canaloplasty + iStent  - Dr Angus Carranza - Scotty Pashto 06/04/2020 /OS 7/16/20
-- (allergic to Rhopressa) Plan: Pt has Glaucoma    Gonio :  Open to CBB      Pachs: 549/555     Today's IOP : 15/19    Tmax  :  40, 43 Target IOP low to mid teens Pt denies Family hx of Glaucoma Left eye is the better seeing eye HVF: (9/7/22) OD: Inferior Step OS: Nasal defect - 12/15/21: 
C/D: 0.9-0.85/0.9-0.9
OCT: 51/64 03/24/22 Pt denies Sulfa Allergy // Pt denies Lung /Heart dx Plan : 
1. Continue Timolol QAM OU Dorzolamide QAM OS 2. IOP lower OD, higher OS. Recommend pt continue w/ same regiment.  
3. RTC in 3 months for IOP check, 24-2, and RNFL w/ Dr. Angus Carranza

## 2023-04-10 ENCOUNTER — OFFICE VISIT (OUTPATIENT)
Dept: URBAN - METROPOLITAN AREA CLINIC 24 | Facility: CLINIC | Age: 83
End: 2023-04-10
Payer: MEDICARE

## 2023-04-10 DIAGNOSIS — H40.1132 PRIMARY OPEN-ANGLE GLAUCOMA, BILATERAL, MODERATE STAGE: ICD-10-CM

## 2023-04-10 DIAGNOSIS — H35.3231 EXUDATIVE AGE-RELATED MACULAR DEGENERATION, BILATERAL, WITH ACTIVE CHOROIDAL NEOVASCULARIZATION: Primary | ICD-10-CM

## 2023-04-10 PROCEDURE — 92134 CPTRZ OPH DX IMG PST SGM RTA: CPT | Performed by: OPHTHALMOLOGY

## 2023-04-10 PROCEDURE — 99214 OFFICE O/P EST MOD 30 MIN: CPT | Performed by: OPHTHALMOLOGY

## 2023-04-10 ASSESSMENT — INTRAOCULAR PRESSURE
OD: 12
OD: 11
OS: 11
OS: 12

## 2023-04-10 NOTE — IMPRESSION/PLAN
Impression: Glaucoma, mild  - NSC Plan: SOMETIMES do AC tap  -- Mod C/d.   TODAY Tap to IOP 11 OU (no compl)      KEEP IOP f/u w Dr. Nader Luque al.   PCIOL imprv'd [facility]/Fxn

## 2023-04-10 NOTE — IMPRESSION/PLAN
Impression: wAMD OS, OD - AREDS2, diet, non- smoker, [place] - Stable w inj. Maintain  - Penny Barnard only / Byooviz Plan: Hx: [[OU now wet AMD. Extend '17. AREDS2, diet, fish, [place], non-smoking]]. TODAY  Avstn OU (proc. note)      Future HRA? Maintain q4mo  NO BETADINE
 RTC EXTND 16w dil, colors, eval - h/o Byooviz OU (AC tap -INJ INFERIOR d/t Trab) Some IRRITANT of eyes / STOP BETADINE / use AVENOVA / AND chng to [employer] Rvw'd FDA-apprv'd on-label biosimilar BYOOVIZ. Prefer to non-FDA-apprv'd off-label Avastin. Biosimilar to ranibizimab injx. D/w'd pt r/b/a and options. Pt expressed understanding desires proceed. On-label FDA-approved drug, yet remains theoretical low (but not zero) risk adverse events -Understood.

## 2023-07-12 ENCOUNTER — OFFICE VISIT (OUTPATIENT)
Dept: URBAN - METROPOLITAN AREA CLINIC 24 | Facility: CLINIC | Age: 83
End: 2023-07-12
Payer: MEDICARE

## 2023-07-12 DIAGNOSIS — H35.3231 EXUDATIVE AGE-RELATED MACULAR DEGENERATION, BILATERAL, WITH ACTIVE CHOROIDAL NEOVASCULARIZATION: ICD-10-CM

## 2023-07-12 DIAGNOSIS — H40.1112 PRIMARY OPEN-ANGLE GLAUCOMA, RIGHT EYE, MODERATE STAGE: Primary | ICD-10-CM

## 2023-07-12 PROCEDURE — 92133 CPTRZD OPH DX IMG PST SGM ON: CPT | Performed by: OPHTHALMOLOGY

## 2023-07-12 PROCEDURE — 99213 OFFICE O/P EST LOW 20 MIN: CPT | Performed by: OPHTHALMOLOGY

## 2023-07-12 PROCEDURE — 92083 EXTENDED VISUAL FIELD XM: CPT | Performed by: OPHTHALMOLOGY

## 2023-07-12 ASSESSMENT — INTRAOCULAR PRESSURE
OD: 14
OS: 18

## 2023-07-12 NOTE — IMPRESSION/PLAN
Impression: Primary open-angle glaucoma, moderate stage, right eye
-- Trabeculectomy OD 9/12/18 // OS  11/28/18, Wet AMD, S/P Trab 2018  OU Dr. Janel Quach- S/P BLEB revision with Xen OS - 11/1/22
-- x/p CE IOL + Canaloplasty + iStent  - Dr David Castro - Lella Sciara 06/04/2020 /OS 7/16/20
-- (allergic to Rhopressa) Plan: Pt has Glaucoma    Gonio :  Open to CBB      Pachs: 549/555     Today's IOP : 14/18   Tmax  :  40, 43 Target IOP low to mid teens Pt denies Family hx of Glaucoma Left eye is the better seeing eye HVF: OD: Inferior Step OS: Nasal defect - 7/12/23 C/D: 0.9-0.85/0.9-0.9
OCT: 57/60 7/12/23 Pt denies Sulfa Allergy // Pt denies Lung /Heart dx Plan : 
1. Continue Timolol QAM OU Dorzolamide QAM OS Discussed details about Glaucoma and that without proper control of pressures irreversible blindness can occur. Patient understands risks. Emphasize compliance with drop and without compliance vision loss progression can occur. 2. IOP lower OD, higher OS. Recommend pt continue w/ same regiment.  
3. Return in 3-4 months for IOP Check with Dr. David Castro

## 2023-07-12 NOTE — IMPRESSION/PLAN
Impression: WET AMD OS then OD -AREDS2, diet, non- smoker, [place]  -- Stable w injx.   Maintain Plan: Continue care with Dr. Shannon Levin as scheduled

## 2023-07-31 ENCOUNTER — OFFICE VISIT (OUTPATIENT)
Dept: URBAN - METROPOLITAN AREA CLINIC 24 | Facility: CLINIC | Age: 83
End: 2023-07-31
Payer: MEDICARE

## 2023-07-31 DIAGNOSIS — H40.1132 PRIMARY OPEN-ANGLE GLAUCOMA, BILATERAL, MODERATE STAGE: ICD-10-CM

## 2023-07-31 DIAGNOSIS — H35.3231 EXUDATIVE AGE-RELATED MACULAR DEGENERATION, BILATERAL, WITH ACTIVE CHOROIDAL NEOVASCULARIZATION: Primary | ICD-10-CM

## 2023-07-31 PROCEDURE — 99214 OFFICE O/P EST MOD 30 MIN: CPT | Performed by: OPHTHALMOLOGY

## 2023-07-31 PROCEDURE — 92134 CPTRZ OPH DX IMG PST SGM RTA: CPT | Performed by: OPHTHALMOLOGY

## 2023-07-31 PROCEDURE — 92250 FUNDUS PHOTOGRAPHY W/I&R: CPT | Performed by: OPHTHALMOLOGY

## 2023-07-31 ASSESSMENT — INTRAOCULAR PRESSURE
OD: 10
OS: 12

## 2023-10-18 ENCOUNTER — OFFICE VISIT (OUTPATIENT)
Dept: URBAN - METROPOLITAN AREA CLINIC 24 | Facility: CLINIC | Age: 83
End: 2023-10-18
Payer: MEDICARE

## 2023-10-18 DIAGNOSIS — H35.3231 EXUDATIVE AGE-RELATED MACULAR DEGENERATION, BILATERAL, WITH ACTIVE CHOROIDAL NEOVASCULARIZATION: ICD-10-CM

## 2023-10-18 DIAGNOSIS — H40.1112 PRIMARY OPEN-ANGLE GLAUCOMA, RIGHT EYE, MODERATE STAGE: Primary | ICD-10-CM

## 2023-10-18 PROCEDURE — 99213 OFFICE O/P EST LOW 20 MIN: CPT | Performed by: OPHTHALMOLOGY

## 2023-10-18 ASSESSMENT — INTRAOCULAR PRESSURE
OS: 16
OD: 15

## 2023-11-15 ENCOUNTER — OFFICE VISIT (OUTPATIENT)
Dept: URBAN - METROPOLITAN AREA CLINIC 24 | Facility: CLINIC | Age: 83
End: 2023-11-15
Payer: MEDICARE

## 2023-11-15 DIAGNOSIS — H35.3231 EXUDATIVE AGE-RELATED MACULAR DEGENERATION, BILATERAL, WITH ACTIVE CHOROIDAL NEOVASCULARIZATION: Primary | ICD-10-CM

## 2023-11-15 DIAGNOSIS — H40.1132 PRIMARY OPEN-ANGLE GLAUCOMA, BILATERAL, MODERATE STAGE: ICD-10-CM

## 2023-11-15 PROCEDURE — 99214 OFFICE O/P EST MOD 30 MIN: CPT | Performed by: OPHTHALMOLOGY

## 2023-11-15 PROCEDURE — 92134 CPTRZ OPH DX IMG PST SGM RTA: CPT | Performed by: OPHTHALMOLOGY

## 2024-02-28 ENCOUNTER — OFFICE VISIT (OUTPATIENT)
Dept: URBAN - METROPOLITAN AREA CLINIC 24 | Facility: CLINIC | Age: 84
End: 2024-02-28
Payer: MEDICARE

## 2024-02-28 DIAGNOSIS — H35.3231 EXUDATIVE AGE-RELATED MACULAR DEGENERATION, BILATERAL, WITH ACTIVE CHOROIDAL NEOVASCULARIZATION: Primary | ICD-10-CM

## 2024-02-28 PROCEDURE — 92134 CPTRZ OPH DX IMG PST SGM RTA: CPT | Performed by: OPHTHALMOLOGY

## 2024-02-28 PROCEDURE — 92242 FLUORESCEIN&ICG ANGIOGRAPHY: CPT | Performed by: OPHTHALMOLOGY

## 2024-02-28 ASSESSMENT — INTRAOCULAR PRESSURE
OD: 12
OS: 17

## 2024-04-17 ENCOUNTER — OFFICE VISIT (OUTPATIENT)
Dept: URBAN - METROPOLITAN AREA CLINIC 24 | Facility: CLINIC | Age: 84
End: 2024-04-17
Payer: MEDICARE

## 2024-04-17 DIAGNOSIS — H40.1112 PRIMARY OPEN-ANGLE GLAUCOMA, RIGHT EYE, MODERATE STAGE: Primary | ICD-10-CM

## 2024-04-17 DIAGNOSIS — H35.3231 EXUDATIVE AGE-RELATED MACULAR DEGENERATION, BILATERAL, WITH ACTIVE CHOROIDAL NEOVASCULARIZATION: ICD-10-CM

## 2024-04-17 PROCEDURE — 92083 EXTENDED VISUAL FIELD XM: CPT | Performed by: OPHTHALMOLOGY

## 2024-04-17 PROCEDURE — 92133 CPTRZD OPH DX IMG PST SGM ON: CPT | Performed by: OPHTHALMOLOGY

## 2024-04-17 PROCEDURE — 99214 OFFICE O/P EST MOD 30 MIN: CPT | Performed by: OPHTHALMOLOGY

## 2024-04-17 ASSESSMENT — INTRAOCULAR PRESSURE
OS: 18
OD: 14

## 2024-04-30 NOTE — IMPRESSION/PLAN
Impression: WET AMD OS then OD -AREDS2, diet, non- smoker, [place]  -- Stable w injx. Maintain Plan: Hx: [[OU now wet AMD. Extend '17. AREDS2, diet, fish, [place], non-smoking]]. TODAY  Avstn OU (proc. note)      Future HRA? Maintain q4mo    RTC EXTEND 14-16w dil OCT eval - h/o  Avstn OU (w AC tap -INJ INFERIOR d/t Trab) No

## 2024-06-17 ENCOUNTER — OFFICE VISIT (OUTPATIENT)
Dept: URBAN - METROPOLITAN AREA CLINIC 24 | Facility: CLINIC | Age: 84
End: 2024-06-17
Payer: MEDICARE

## 2024-06-17 DIAGNOSIS — H35.3231 EXUDATIVE AGE-RELATED MACULAR DEGENERATION, BILATERAL, WITH ACTIVE CHOROIDAL NEOVASCULARIZATION: Primary | ICD-10-CM

## 2024-06-17 PROCEDURE — 92134 CPTRZ OPH DX IMG PST SGM RTA: CPT | Performed by: OPHTHALMOLOGY

## 2024-06-17 ASSESSMENT — INTRAOCULAR PRESSURE
OD: 12
OS: 15

## 2024-07-03 ENCOUNTER — OFFICE VISIT (OUTPATIENT)
Dept: URBAN - METROPOLITAN AREA CLINIC 24 | Facility: CLINIC | Age: 84
End: 2024-07-03
Payer: MEDICARE

## 2024-07-03 DIAGNOSIS — H40.1112 PRIMARY OPEN-ANGLE GLAUCOMA, RIGHT EYE, MODERATE STAGE: Primary | ICD-10-CM

## 2024-07-03 PROCEDURE — 92083 EXTENDED VISUAL FIELD XM: CPT | Performed by: STUDENT IN AN ORGANIZED HEALTH CARE EDUCATION/TRAINING PROGRAM

## 2024-07-03 PROCEDURE — 99214 OFFICE O/P EST MOD 30 MIN: CPT | Performed by: STUDENT IN AN ORGANIZED HEALTH CARE EDUCATION/TRAINING PROGRAM

## 2024-07-03 RX ORDER — BRIMONIDINE TARTRATE 2 MG/ML
0.2 % SOLUTION/ DROPS OPHTHALMIC
Qty: 5 | Refills: 4 | Status: ACTIVE
Start: 2024-07-03

## 2024-08-05 ENCOUNTER — OFFICE VISIT (OUTPATIENT)
Dept: URBAN - METROPOLITAN AREA CLINIC 24 | Facility: CLINIC | Age: 84
End: 2024-08-05
Payer: MEDICARE

## 2024-08-05 DIAGNOSIS — H40.1112 PRIMARY OPEN-ANGLE GLAUCOMA, RIGHT EYE, MODERATE STAGE: Primary | ICD-10-CM

## 2024-08-05 PROCEDURE — 99214 OFFICE O/P EST MOD 30 MIN: CPT | Performed by: STUDENT IN AN ORGANIZED HEALTH CARE EDUCATION/TRAINING PROGRAM

## 2024-08-05 PROCEDURE — 92250 FUNDUS PHOTOGRAPHY W/I&R: CPT | Performed by: STUDENT IN AN ORGANIZED HEALTH CARE EDUCATION/TRAINING PROGRAM

## 2024-08-05 ASSESSMENT — INTRAOCULAR PRESSURE
OS: 16
OD: 14

## 2024-08-28 ENCOUNTER — OFFICE VISIT (OUTPATIENT)
Dept: URBAN - METROPOLITAN AREA CLINIC 24 | Facility: CLINIC | Age: 84
End: 2024-08-28
Payer: MEDICARE

## 2024-08-28 DIAGNOSIS — H40.1112 PRIMARY OPEN-ANGLE GLAUCOMA, RIGHT EYE, MODERATE STAGE: Primary | ICD-10-CM

## 2024-08-28 PROCEDURE — 99213 OFFICE O/P EST LOW 20 MIN: CPT | Performed by: OPHTHALMOLOGY

## 2024-08-28 ASSESSMENT — INTRAOCULAR PRESSURE
OD: 14
OS: 16

## 2024-10-07 ENCOUNTER — OFFICE VISIT (OUTPATIENT)
Dept: URBAN - METROPOLITAN AREA CLINIC 24 | Facility: CLINIC | Age: 84
End: 2024-10-07
Payer: MEDICARE

## 2024-10-07 DIAGNOSIS — H40.1132 PRIMARY OPEN-ANGLE GLAUCOMA, BILATERAL, MODERATE STAGE: ICD-10-CM

## 2024-10-07 DIAGNOSIS — H35.3231 EXUDATIVE AGE-RELATED MACULAR DEGENERATION, BILATERAL, WITH ACTIVE CHOROIDAL NEOVASCULARIZATION: Primary | ICD-10-CM

## 2024-10-07 PROCEDURE — 99214 OFFICE O/P EST MOD 30 MIN: CPT | Performed by: OPHTHALMOLOGY

## 2024-10-07 PROCEDURE — 92134 CPTRZ OPH DX IMG PST SGM RTA: CPT | Performed by: OPHTHALMOLOGY

## 2024-10-07 ASSESSMENT — INTRAOCULAR PRESSURE
OS: 19
OD: 18

## 2024-12-05 ENCOUNTER — OFFICE VISIT (OUTPATIENT)
Dept: URBAN - METROPOLITAN AREA CLINIC 24 | Facility: CLINIC | Age: 84
End: 2024-12-05
Payer: MEDICARE

## 2024-12-05 DIAGNOSIS — H40.1112 PRIMARY OPEN-ANGLE GLAUCOMA, RIGHT EYE, MODERATE STAGE: Primary | ICD-10-CM

## 2024-12-05 PROCEDURE — 99214 OFFICE O/P EST MOD 30 MIN: CPT | Performed by: OPHTHALMOLOGY

## 2024-12-05 PROCEDURE — 92083 EXTENDED VISUAL FIELD XM: CPT | Performed by: OPHTHALMOLOGY

## 2024-12-05 ASSESSMENT — INTRAOCULAR PRESSURE
OS: 18
OD: 18

## 2025-01-27 ENCOUNTER — OFFICE VISIT (OUTPATIENT)
Dept: URBAN - METROPOLITAN AREA CLINIC 24 | Facility: CLINIC | Age: 85
End: 2025-01-27
Payer: MEDICARE

## 2025-01-27 DIAGNOSIS — H35.3231 EXUDATIVE AGE-RELATED MACULAR DEGENERATION, BILATERAL, WITH ACTIVE CHOROIDAL NEOVASCULARIZATION: Primary | ICD-10-CM

## 2025-01-27 PROCEDURE — 92134 CPTRZ OPH DX IMG PST SGM RTA: CPT | Performed by: OPHTHALMOLOGY

## 2025-01-27 PROCEDURE — 92242 FLUORESCEIN&ICG ANGIOGRAPHY: CPT | Performed by: OPHTHALMOLOGY

## 2025-01-27 ASSESSMENT — INTRAOCULAR PRESSURE
OD: 8
OS: 11

## 2025-03-05 ENCOUNTER — OFFICE VISIT (OUTPATIENT)
Dept: URBAN - METROPOLITAN AREA CLINIC 24 | Facility: CLINIC | Age: 85
End: 2025-03-05
Payer: MEDICARE

## 2025-03-05 DIAGNOSIS — H40.1112 PRIMARY OPEN-ANGLE GLAUCOMA, RIGHT EYE, MODERATE STAGE: Primary | ICD-10-CM

## 2025-03-05 RX ORDER — DORZOLAMIDE HCL 20 MG/ML
2 % SOLUTION/ DROPS OPHTHALMIC
Qty: 10 | Refills: 3 | Status: ACTIVE
Start: 2025-03-05

## 2025-03-05 ASSESSMENT — INTRAOCULAR PRESSURE
OD: 17
OS: 16

## 2025-05-19 ENCOUNTER — OFFICE VISIT (OUTPATIENT)
Dept: URBAN - METROPOLITAN AREA CLINIC 24 | Facility: CLINIC | Age: 85
End: 2025-05-19
Payer: MEDICARE

## 2025-05-19 DIAGNOSIS — H35.3231 EXUDATIVE AGE-RELATED MACULAR DEGENERATION, BILATERAL, WITH ACTIVE CHOROIDAL NEOVASCULARIZATION: Primary | ICD-10-CM

## 2025-05-19 DIAGNOSIS — H40.1132 PRIMARY OPEN-ANGLE GLAUCOMA, BILATERAL, MODERATE STAGE: ICD-10-CM

## 2025-05-19 PROCEDURE — 92134 CPTRZ OPH DX IMG PST SGM RTA: CPT | Performed by: OPHTHALMOLOGY

## 2025-05-19 ASSESSMENT — INTRAOCULAR PRESSURE
OD: 9
OS: 11

## 2025-06-04 ENCOUNTER — OFFICE VISIT (OUTPATIENT)
Dept: URBAN - METROPOLITAN AREA CLINIC 24 | Facility: CLINIC | Age: 85
End: 2025-06-04
Payer: MEDICARE

## 2025-06-04 DIAGNOSIS — H40.1132 PRIMARY OPEN-ANGLE GLAUCOMA, BILATERAL, MODERATE STAGE: ICD-10-CM

## 2025-06-04 DIAGNOSIS — H40.1112 PRIMARY OPEN-ANGLE GLAUCOMA, RIGHT EYE, MODERATE STAGE: Primary | ICD-10-CM

## 2025-06-04 DIAGNOSIS — H40.1121 PRIMARY OPEN-ANGLE GLAUCOMA, LEFT EYE, MILD STAGE: ICD-10-CM

## 2025-06-04 PROCEDURE — 92083 EXTENDED VISUAL FIELD XM: CPT | Performed by: OPHTHALMOLOGY

## 2025-06-04 PROCEDURE — 99213 OFFICE O/P EST LOW 20 MIN: CPT | Performed by: OPHTHALMOLOGY

## 2025-06-04 ASSESSMENT — INTRAOCULAR PRESSURE
OS: 20
OD: 16